# Patient Record
Sex: FEMALE | Race: WHITE | NOT HISPANIC OR LATINO | Employment: OTHER | URBAN - METROPOLITAN AREA
[De-identification: names, ages, dates, MRNs, and addresses within clinical notes are randomized per-mention and may not be internally consistent; named-entity substitution may affect disease eponyms.]

---

## 2023-09-29 PROBLEM — R41.3 MEMORY LOSS: Status: ACTIVE | Noted: 2023-09-29

## 2023-09-29 PROBLEM — R06.9 BREATHING PROBLEM: Status: ACTIVE | Noted: 2023-09-29

## 2023-09-29 PROBLEM — F02.80 MAJOR NEUROCOGNITIVE DISORDER DUE TO MULTIPLE ETIOLOGIES WITHOUT BEHAVIORAL DISTURBANCE (MULTI): Status: ACTIVE | Noted: 2023-09-29

## 2023-09-29 PROBLEM — M54.9 BACK ARTHRALGIA: Status: ACTIVE | Noted: 2023-09-29

## 2023-09-29 PROBLEM — I73.00 RAYNAUD'S PHENOMENON WITHOUT GANGRENE: Status: ACTIVE | Noted: 2023-09-29

## 2023-09-29 PROBLEM — G89.29 CHRONIC LOW BACK PAIN: Status: ACTIVE | Noted: 2023-09-29

## 2023-09-29 PROBLEM — F32.A ANXIETY AND DEPRESSION: Status: ACTIVE | Noted: 2023-09-29

## 2023-09-29 PROBLEM — I35.1 MILD AORTIC REGURGITATION: Status: ACTIVE | Noted: 2023-09-29

## 2023-09-29 PROBLEM — R00.1 SINUS BRADYCARDIA: Status: ACTIVE | Noted: 2023-09-29

## 2023-09-29 PROBLEM — R41.844 EXECUTIVE FUNCTION DEFICIT: Status: ACTIVE | Noted: 2023-09-29

## 2023-09-29 PROBLEM — I25.10 CORONARY ARTERY DISEASE: Status: ACTIVE | Noted: 2023-09-29

## 2023-09-29 PROBLEM — M47.816 LUMBAR SPONDYLOSIS: Status: ACTIVE | Noted: 2023-09-29

## 2023-09-29 PROBLEM — Z95.0 PACEMAKER: Status: ACTIVE | Noted: 2023-09-29

## 2023-09-29 PROBLEM — R76.8 ANA POSITIVE: Status: ACTIVE | Noted: 2023-09-29

## 2023-09-29 PROBLEM — M81.0 OSTEOPOROSIS: Status: ACTIVE | Noted: 2023-09-29

## 2023-09-29 PROBLEM — E78.2 MIXED HYPERLIPIDEMIA: Status: ACTIVE | Noted: 2023-09-29

## 2023-09-29 PROBLEM — M54.50 CHRONIC LOW BACK PAIN: Status: ACTIVE | Noted: 2023-09-29

## 2023-09-29 PROBLEM — H61.20 IMPACTED CERUMEN: Status: ACTIVE | Noted: 2023-09-29

## 2023-09-29 PROBLEM — R94.31 ABNORMAL EKG: Status: ACTIVE | Noted: 2023-09-29

## 2023-09-29 PROBLEM — F41.9 ANXIETY AND DEPRESSION: Status: ACTIVE | Noted: 2023-09-29

## 2023-09-29 RX ORDER — VIT C/E/ZN/COPPR/LUTEIN/ZEAXAN 250MG-90MG
CAPSULE ORAL
COMMUNITY

## 2023-09-29 RX ORDER — BROMFENAC SODIUM 0.7 MG/ML
1 SOLUTION/ DROPS OPHTHALMIC
COMMUNITY
Start: 2023-10-02 | End: 2023-11-01

## 2023-09-29 RX ORDER — LOTEPREDNOL ETABONATE 3.8 MG/G
1 GEL OPHTHALMIC 3 TIMES DAILY
COMMUNITY
Start: 2023-10-02 | End: 2023-11-01

## 2023-09-29 RX ORDER — CHOLECALCIFEROL (VITAMIN D3) 25 MCG
1 TABLET ORAL DAILY
COMMUNITY
Start: 2020-06-04

## 2023-09-29 RX ORDER — CIPROFLOXACIN HYDROCHLORIDE 3 MG/ML
SOLUTION/ DROPS OPHTHALMIC
COMMUNITY

## 2023-09-29 RX ORDER — DONEPEZIL HYDROCHLORIDE 5 MG/1
5 TABLET, FILM COATED ORAL NIGHTLY
COMMUNITY
End: 2023-12-26 | Stop reason: WASHOUT

## 2023-09-29 RX ORDER — DENOSUMAB 60 MG/ML
INJECTION SUBCUTANEOUS
COMMUNITY

## 2023-09-29 RX ORDER — ROSUVASTATIN CALCIUM 40 MG/1
40 TABLET, COATED ORAL
COMMUNITY
Start: 2023-05-08 | End: 2024-06-10

## 2023-09-29 RX ORDER — ALBUTEROL SULFATE 0.83 MG/ML
3 SOLUTION RESPIRATORY (INHALATION) EVERY 6 HOURS PRN
COMMUNITY

## 2023-09-29 RX ORDER — LANOLIN ALCOHOL/MO/W.PET/CERES
1 CREAM (GRAM) TOPICAL DAILY
COMMUNITY

## 2023-09-29 RX ORDER — BESIFLOXACIN 6 MG/ML
1 SUSPENSION OPHTHALMIC 3 TIMES DAILY
COMMUNITY
Start: 2023-10-02 | End: 2023-10-09

## 2023-09-29 RX ORDER — TIMOLOL MALEATE 5 MG/ML
1 SOLUTION/ DROPS OPHTHALMIC 2 TIMES DAILY
COMMUNITY

## 2023-09-29 RX ORDER — VIT C/E/CUPERIC/ZINC/LUTEIN 226-90-0.8
1 CAPSULE ORAL DAILY
COMMUNITY

## 2023-09-29 RX ORDER — DONEPEZIL HYDROCHLORIDE 10 MG/1
1 TABLET, FILM COATED ORAL NIGHTLY
COMMUNITY
Start: 2016-12-05 | End: 2023-12-26 | Stop reason: SDUPTHER

## 2023-09-29 RX ORDER — CALCIUM CARBONATE 600 MG
1 TABLET ORAL DAILY
COMMUNITY

## 2023-09-29 RX ORDER — HYDROXYZINE HYDROCHLORIDE 10 MG/1
10 TABLET, FILM COATED ORAL 2 TIMES DAILY
COMMUNITY
Start: 2023-04-18

## 2023-09-29 RX ORDER — OMEPRAZOLE 40 MG/1
40 CAPSULE, DELAYED RELEASE ORAL DAILY
COMMUNITY

## 2023-09-29 RX ORDER — ALENDRONATE SODIUM 70 MG/1
70 TABLET ORAL
COMMUNITY
End: 2024-06-03 | Stop reason: WASHOUT

## 2023-09-29 RX ORDER — SIMVASTATIN 40 MG/1
0.5 TABLET, FILM COATED ORAL NIGHTLY
COMMUNITY
End: 2024-06-03 | Stop reason: WASHOUT

## 2023-09-29 RX ORDER — ESCITALOPRAM OXALATE 20 MG/1
0.5 TABLET ORAL DAILY
COMMUNITY
Start: 2018-01-05

## 2023-10-05 ENCOUNTER — TRANSCRIBE ORDERS (OUTPATIENT)
Dept: PAIN MEDICINE | Facility: CLINIC | Age: 83
End: 2023-10-05
Payer: MEDICARE

## 2023-10-05 DIAGNOSIS — M47.817 LUMBOSACRAL SPONDYLOSIS WITHOUT MYELOPATHY: ICD-10-CM

## 2023-10-06 ENCOUNTER — ANCILLARY PROCEDURE (OUTPATIENT)
Dept: RADIOLOGY | Facility: CLINIC | Age: 83
End: 2023-10-06
Payer: MEDICARE

## 2023-10-06 ENCOUNTER — HOSPITAL ENCOUNTER (OUTPATIENT)
Dept: PAIN MEDICINE | Facility: CLINIC | Age: 83
Discharge: HOME | End: 2023-10-06
Payer: MEDICARE

## 2023-10-06 ENCOUNTER — PREP FOR PROCEDURE (OUTPATIENT)
Dept: PAIN MEDICINE | Facility: CLINIC | Age: 83
End: 2023-10-06

## 2023-10-06 ENCOUNTER — APPOINTMENT (OUTPATIENT)
Dept: PAIN MEDICINE | Facility: CLINIC | Age: 83
End: 2023-10-06
Payer: MEDICARE

## 2023-10-06 VITALS
TEMPERATURE: 95.5 F | SYSTOLIC BLOOD PRESSURE: 99 MMHG | HEART RATE: 60 BPM | OXYGEN SATURATION: 100 % | DIASTOLIC BLOOD PRESSURE: 72 MMHG | RESPIRATION RATE: 16 BRPM

## 2023-10-06 DIAGNOSIS — M47.816 LUMBAR SPONDYLOSIS: Primary | ICD-10-CM

## 2023-10-06 DIAGNOSIS — M47.817 LUMBOSACRAL SPONDYLOSIS WITHOUT MYELOPATHY: ICD-10-CM

## 2023-10-06 PROCEDURE — 64493 INJ PARAVERT F JNT L/S 1 LEV: CPT | Performed by: ANESTHESIOLOGY

## 2023-10-06 PROCEDURE — 2500000005 HC RX 250 GENERAL PHARMACY W/O HCPCS

## 2023-10-06 PROCEDURE — 64494 INJ PARAVERT F JNT L/S 2 LEV: CPT | Performed by: ANESTHESIOLOGY

## 2023-10-06 PROCEDURE — 77003 FLUOROGUIDE FOR SPINE INJECT: CPT

## 2023-10-06 PROCEDURE — 2500000004 HC RX 250 GENERAL PHARMACY W/ HCPCS (ALT 636 FOR OP/ED)

## 2023-10-06 PROCEDURE — A4216 STERILE WATER/SALINE, 10 ML: HCPCS

## 2023-10-06 RX ORDER — SODIUM CHLORIDE 9 MG/ML
INJECTION, SOLUTION INTRAMUSCULAR; INTRAVENOUS; SUBCUTANEOUS
Status: COMPLETED
Start: 2023-10-06 | End: 2023-10-06

## 2023-10-06 RX ORDER — TRIAMCINOLONE ACETONIDE 40 MG/ML
INJECTION, SUSPENSION INTRA-ARTICULAR; INTRAMUSCULAR
Status: COMPLETED
Start: 2023-10-06 | End: 2023-10-06

## 2023-10-06 RX ORDER — LIDOCAINE HYDROCHLORIDE 5 MG/ML
INJECTION, SOLUTION INFILTRATION; INTRAVENOUS
Status: COMPLETED
Start: 2023-10-06 | End: 2023-10-06

## 2023-10-06 RX ORDER — ROPIVACAINE HYDROCHLORIDE 5 MG/ML
INJECTION, SOLUTION EPIDURAL; INFILTRATION; PERINEURAL
Status: COMPLETED
Start: 2023-10-06 | End: 2023-10-06

## 2023-10-06 RX ADMIN — ROPIVACAINE HYDROCHLORIDE 100 MG: 5 INJECTION, SOLUTION EPIDURAL; INFILTRATION; PERINEURAL at 14:38

## 2023-10-06 RX ADMIN — TRIAMCINOLONE ACETONIDE 40 MG: 40 INJECTION, SUSPENSION INTRA-ARTICULAR; INTRAMUSCULAR at 14:40

## 2023-10-06 RX ADMIN — SODIUM CHLORIDE 10 ML: 9 INJECTION, SOLUTION INTRAMUSCULAR; INTRAVENOUS; SUBCUTANEOUS at 14:39

## 2023-10-06 RX ADMIN — LIDOCAINE HYDROCHLORIDE 250 MG: 5 INJECTION, SOLUTION INFILTRATION at 14:38

## 2023-10-06 ASSESSMENT — ENCOUNTER SYMPTOMS
CONSTIPATION: 0
DIARRHEA: 0
WEAKNESS: 0
CHILLS: 0
SHORTNESS OF BREATH: 0
DIZZINESS: 0
DIAPHORESIS: 0
SPEECH DIFFICULTY: 0
EYE DISCHARGE: 0
COUGH: 0
NECK STIFFNESS: 1
NECK PAIN: 1
NUMBNESS: 1
WHEEZING: 0
SEIZURES: 0
DIFFICULTY URINATING: 0
BACK PAIN: 1
NAUSEA: 0
VOMITING: 0
ARTHRALGIAS: 1
CHEST TIGHTNESS: 0
BLOOD IN STOOL: 0
FEVER: 0

## 2023-10-06 ASSESSMENT — PAIN - FUNCTIONAL ASSESSMENT: PAIN_FUNCTIONAL_ASSESSMENT: 0-10

## 2023-10-06 ASSESSMENT — PAIN SCALES - GENERAL: PAINLEVEL_OUTOF10: 5 - MODERATE PAIN

## 2023-10-06 ASSESSMENT — COLUMBIA-SUICIDE SEVERITY RATING SCALE - C-SSRS
2. HAVE YOU ACTUALLY HAD ANY THOUGHTS OF KILLING YOURSELF?: NO
1. IN THE PAST MONTH, HAVE YOU WISHED YOU WERE DEAD OR WISHED YOU COULD GO TO SLEEP AND NOT WAKE UP?: NO
6. HAVE YOU EVER DONE ANYTHING, STARTED TO DO ANYTHING, OR PREPARED TO DO ANYTHING TO END YOUR LIFE?: NO

## 2023-10-06 NOTE — H&P
History Of Present Illness  Marysol Hill is a 83 y.o. female presenting with chronic low back pain.      Past Medical History  Past Medical History:   Diagnosis Date    Personal history of other diseases of the circulatory system     History of abnormal electrocardiography    Personal history of other diseases of the circulatory system 08/31/2019    History of sinus bradycardia    Personal history of other endocrine, nutritional and metabolic disease     History of hypercholesterolemia       Surgical History  Past Surgical History:   Procedure Laterality Date    APPENDECTOMY  01/13/2016    Appendectomy    HYSTERECTOMY  01/13/2016    Hysterectomy    OTHER SURGICAL HISTORY  01/13/2016    Parathyroid Resection    OTHER SURGICAL HISTORY  10/28/2020    Pacemaker insertion        Social History  She reports that she has quit smoking. Her smoking use included cigarettes. She has never used smokeless tobacco. She reports current alcohol use. She reports that she does not use drugs.    Family History  Family History   Problem Relation Name Age of Onset    Alzheimer's disease Mother      Dementia Mother      Other (CVA) Father          Allergies  Mushroom    Review of Systems   Constitutional:  Negative for chills, diaphoresis and fever.   HENT:  Negative for ear discharge and tinnitus.    Eyes:  Negative for discharge.   Respiratory:  Negative for cough, chest tightness, shortness of breath and wheezing.    Cardiovascular:  Negative for chest pain.   Gastrointestinal:  Negative for blood in stool, constipation, diarrhea, nausea and vomiting.   Endocrine: Negative for polyuria.   Genitourinary:  Negative for difficulty urinating.   Musculoskeletal:  Positive for arthralgias, back pain, gait problem, neck pain and neck stiffness.   Skin:  Negative for rash.   Neurological:  Positive for numbness. Negative for dizziness, seizures, speech difficulty and weakness.        Physical Exam  Constitutional:       Appearance: Normal  appearance.   HENT:      Head: Normocephalic.      Nose: Nose normal.      Mouth/Throat:      Mouth: Mucous membranes are moist.      Pharynx: Oropharynx is clear.   Eyes:      Extraocular Movements: Extraocular movements intact.      Conjunctiva/sclera: Conjunctivae normal.      Pupils: Pupils are equal, round, and reactive to light.   Cardiovascular:      Rate and Rhythm: Normal rate.   Pulmonary:      Effort: Pulmonary effort is normal. No respiratory distress.      Breath sounds: No wheezing.   Chest:      Chest wall: No tenderness.   Abdominal:      Palpations: Abdomen is soft.   Musculoskeletal:      Cervical back: No rigidity.   Skin:     General: Skin is warm and dry.      Findings: No rash.   Neurological:      Mental Status: She is alert and oriented to person, place, and time.      Sensory: Sensory deficit present.      Motor: Weakness present.      Gait: Gait abnormal.   Psychiatric:         Mood and Affect: Mood normal.         Behavior: Behavior normal.          Last Recorded Vitals  There were no vitals taken for this visit.    Assessment/Plan     Lumbar spondylosis     Bilateral medial branch nerve blocks at L4/5 and L5/S1          Mike Matthews MD

## 2023-10-06 NOTE — PROGRESS NOTES
Dx: Lumbar spondylosis.     PROCEDURE:   Bilateral L4/5 and L5/S1 medial branch nerve blocsk   2.   Fluoroscopic guidance     The patient was identified in the preop hold after risks benefits and alternatives to the procedure were discussed. Informed consent was obtained. The patient was then taken to the OR and placed in the prone position on the operating table with standard ASA monitors applied. The skin of their lumbar spine was prepped and draped in the usual sterile fashion using Chloraprep. Fluoroscopic guidance was used to dipak the skin for initial needle placement. Next the skin and the subcutaneous tissues was anesthetized with 6ml of 0.5% Lidocaine.  Then FOUR 20 g 3 1/2 inch VENOM radiofrequency (RF) needles were advanced under fluoroscopic guidance towards the mid points of the articular process of BILATERAL  L4/5 AND L5/S1.     Then 5ml of 0.5% Ropivacaine with 40mg of KENALOG was injected in equally divided doses at all sites. The needles were removed, hemostasis obtained and needle entry sites covered with a sterile dressing.  The patient tolerated the procedure well without any problems and vital signs remained stable throughout. The patient was then taken to the Recovery Area and discharged home in good condition.    PLAN: The pt will follow up in the office in one to two months' time to report their results with the procedure.

## 2023-10-10 ENCOUNTER — HOSPITAL ENCOUNTER (OUTPATIENT)
Dept: CARDIOLOGY | Facility: CLINIC | Age: 83
Discharge: HOME | End: 2023-10-10
Payer: MEDICARE

## 2023-10-10 ENCOUNTER — APPOINTMENT (OUTPATIENT)
Dept: PHYSICAL THERAPY | Facility: CLINIC | Age: 83
End: 2023-10-10
Payer: MEDICARE

## 2023-10-10 DIAGNOSIS — I49.5 SSS (SICK SINUS SYNDROME) (MULTI): ICD-10-CM

## 2023-10-10 DIAGNOSIS — Z95.0 CARDIAC PACEMAKER IN SITU: ICD-10-CM

## 2023-10-10 DIAGNOSIS — R00.1 SINUS BRADYCARDIA: ICD-10-CM

## 2023-10-10 PROCEDURE — 93294 REM INTERROG EVL PM/LDLS PM: CPT | Performed by: INTERNAL MEDICINE

## 2023-10-10 PROCEDURE — 93296 REM INTERROG EVL PM/IDS: CPT

## 2023-10-17 ENCOUNTER — APPOINTMENT (OUTPATIENT)
Dept: PHYSICAL THERAPY | Facility: CLINIC | Age: 83
End: 2023-10-17
Payer: MEDICARE

## 2023-10-24 ENCOUNTER — HOSPITAL ENCOUNTER (OUTPATIENT)
Dept: CARDIOLOGY | Facility: CLINIC | Age: 83
Discharge: HOME | End: 2023-10-24
Payer: MEDICARE

## 2023-10-24 ENCOUNTER — APPOINTMENT (OUTPATIENT)
Dept: PHYSICAL THERAPY | Facility: CLINIC | Age: 83
End: 2023-10-24
Payer: MEDICARE

## 2023-10-24 DIAGNOSIS — I49.5 SICK SINUS SYNDROME (MULTI): ICD-10-CM

## 2023-10-25 DIAGNOSIS — I49.5 SICK SINUS SYNDROME (MULTI): ICD-10-CM

## 2023-11-06 ENCOUNTER — OFFICE VISIT (OUTPATIENT)
Dept: PAIN MEDICINE | Facility: CLINIC | Age: 83
End: 2023-11-06
Payer: MEDICARE

## 2023-11-06 VITALS
HEART RATE: 60 BPM | RESPIRATION RATE: 15 BRPM | DIASTOLIC BLOOD PRESSURE: 60 MMHG | SYSTOLIC BLOOD PRESSURE: 128 MMHG | TEMPERATURE: 96.3 F | WEIGHT: 118 LBS | HEIGHT: 64 IN | BODY MASS INDEX: 20.14 KG/M2

## 2023-11-06 DIAGNOSIS — M47.817 FACET ARTHROPATHY, LUMBOSACRAL: ICD-10-CM

## 2023-11-06 DIAGNOSIS — M54.50 CHRONIC LOW BACK PAIN, UNSPECIFIED BACK PAIN LATERALITY, UNSPECIFIED WHETHER SCIATICA PRESENT: ICD-10-CM

## 2023-11-06 DIAGNOSIS — M47.816 LUMBAR SPONDYLOSIS: Primary | ICD-10-CM

## 2023-11-06 DIAGNOSIS — G89.29 CHRONIC LOW BACK PAIN, UNSPECIFIED BACK PAIN LATERALITY, UNSPECIFIED WHETHER SCIATICA PRESENT: ICD-10-CM

## 2023-11-06 DIAGNOSIS — M54.9 BACK ARTHRALGIA: ICD-10-CM

## 2023-11-06 PROCEDURE — 99214 OFFICE O/P EST MOD 30 MIN: CPT | Performed by: ANESTHESIOLOGY

## 2023-11-06 SDOH — ECONOMIC STABILITY: FOOD INSECURITY: WITHIN THE PAST 12 MONTHS, YOU WORRIED THAT YOUR FOOD WOULD RUN OUT BEFORE YOU GOT MONEY TO BUY MORE.: NEVER TRUE

## 2023-11-06 SDOH — ECONOMIC STABILITY: FOOD INSECURITY: WITHIN THE PAST 12 MONTHS, THE FOOD YOU BOUGHT JUST DIDN'T LAST AND YOU DIDN'T HAVE MONEY TO GET MORE.: NEVER TRUE

## 2023-11-06 ASSESSMENT — LIFESTYLE VARIABLES
HOW OFTEN DO YOU HAVE A DRINK CONTAINING ALCOHOL: MONTHLY OR LESS
AUDIT-C TOTAL SCORE: 2
HOW MANY STANDARD DRINKS CONTAINING ALCOHOL DO YOU HAVE ON A TYPICAL DAY: 1 OR 2
SKIP TO QUESTIONS 9-10: 0
HOW OFTEN DO YOU HAVE SIX OR MORE DRINKS ON ONE OCCASION: LESS THAN MONTHLY

## 2023-11-06 ASSESSMENT — PAIN SCALES - GENERAL: PAINLEVEL: 5

## 2023-11-06 ASSESSMENT — PATIENT HEALTH QUESTIONNAIRE - PHQ9
1. LITTLE INTEREST OR PLEASURE IN DOING THINGS: NOT AT ALL
2. FEELING DOWN, DEPRESSED OR HOPELESS: NOT AT ALL
SUM OF ALL RESPONSES TO PHQ9 QUESTIONS 1 AND 2: 0

## 2023-11-06 NOTE — PROGRESS NOTES
History Of Present Illness  Marysol Hill is a 83 y.o. female presenting with   Chief Complaint   Patient presents with    Back Pain     Patient returns for  chronic low back pain across her back worse on the right side. The pain is constant, worse with getting out of chair and better with sitting. The pain is a constant aching like sensation. Denies LE paresthesias, weakness, saddle anesthesia, bowel or bladder incontinence. To manage this pain the patient has attempted IBUPROFEN AND PT WITH NO RELIEF. The patients chronic GERD, Asthma are stable    PSHx  -Appendectomy  -Parathyroid surgery  -Hysterectomy    SocHx  -Quit tob 60 years ago  -Occ EtOH  -Worked for chief of surgery at [employer]    PAIN SCORE: 7 / 10    Neuro: Dr. Medrano  Surgeon: Dr. Fairchild          Past Medical History  She has a past medical history of Personal history of other diseases of the circulatory system, Personal history of other diseases of the circulatory system (08/31/2019), and Personal history of other endocrine, nutritional and metabolic disease.    Surgical History  She has a past surgical history that includes Appendectomy (01/13/2016); Hysterectomy (01/13/2016); Other surgical history (01/13/2016); and Other surgical history (10/28/2020).     Social History  She reports that she has quit smoking. Her smoking use included cigarettes. She has never used smokeless tobacco. She reports current alcohol use. She reports that she does not use drugs.    Family History  Family History   Problem Relation Name Age of Onset    Alzheimer's disease Mother      Dementia Mother      Other (CVA) Father          Allergies  Mushroom    Review of Systems    All other systems reviewed and negative for any deficits. Pertinent positives and negatives were considered in the medical decision making process.        Physical Exam  /60   Pulse 60   Temp 35.7 °C (96.3 °F)   Resp 15   Wt 53.5 kg (118 lb)     General: Pt appears stated  age    Eyes: Conjunctiva non-icteric and lids without obvious rash or drooping. Pupils are symmetric    ENT: External ears are without deformity or rash. Hearing is grossly intact    Neck: No JVD noted, tracheal position midline.     Respiratory: No gasping or shortness of breath noted, no use of accessory muscles noted    Cardiovascular: Extremities show no edema or varicosities    Skin: No rashes or open lesions/ulcers identified on skin.     Musculoskeletal: Gait is grossly normal    Digits/nails show no clubbing or cyanosis    Exam of muscles/joints/bones shows no gross atrophy and no abnormal/involuntary movements in the head/neckNo asymmetry or masses noted in the head/neck    Stability: no subluxation noted on movement of bilateral upper extremities or head/neck    Strength: 4/5 in B/L lower extremities     Range of Motion: dec spinal flexion and extension due to pain.     Sensation: In tact     Cranial nerves 2-12 are grossly intact    Psychiatric: Pt is alert and oriented to time, place and person.         Assessment/Plan   1. Lumbar spondylosis  FL pain management TC      2. Back arthralgia        3. Chronic low back pain, unspecified back pain laterality, unspecified whether sciatica present        4. Facet arthropathy, lumbosacral   Medial Nerve Branch Block          Lumbar spondylosis (721.3) (M47.816)   · Chronic low back pain (724.2,338.29) (M54.50,G89.29)    Provider Impressions    1. I have provided the patient with a list of physical therapy exercises to learn and perform to strengthen core, maintain stabilization, and reduce pain. We reviewed the exercises in detail and I encouraged them to perform them on a regular basis.    - The pt is scheduled to start water therapy.     2. I would recommend the pt start on Gabapentin to help with nerve related pain. We discussed the risks, benefits, and side effects to this medication including the mechanism of action and the pt understands and agrees.    3.  I extensively reviewed the patients MRI findings (8-3-2023 [facility]) in detail, including review of the actual images and provided a detailed explanation of the findings using a spine model.     There is lumbar spondylosis and disc degeneration at multiple levels.     4. The patient is a candidate for an LUMBAR MEDIAL BRANCH L4/5 and L5/S1 bilaterally under fluoroscopic guidance to treat back pain. I spent time with the patient discussing all of the risks, benefits, and alternatives to this measure. Including but not limited to spinal infection, epidural hematoma/abscess, paralysis, nerve injury, steroid effects, and spinal headache. The patient understands and agrees to proceed.    Her last injection at this level was on 10-6-2023 and she reported 80% relief of her pain for 1 months time. She was able to sit and stand with much less pain.     Will plan a repeat diagnostic block in November 2023. In anticipation for an RFA if she obtains good relief of her pain.     I spent time with the patient reviewing their imaging and discussing the risks benefits and alternatives to the above plan. A total of 30 minutes was spent reviewing the data and greater than 50% of that time was with the patient during the face to face encounter discussing treatment options both surgical, non-surgical, and minimally invasive techniques.              Mike Matthews MD

## 2023-11-08 ENCOUNTER — HOSPITAL ENCOUNTER (OUTPATIENT)
Dept: RADIOLOGY | Facility: HOSPITAL | Age: 83
Discharge: HOME | End: 2023-11-08
Payer: MEDICARE

## 2023-11-08 ENCOUNTER — HOSPITAL ENCOUNTER (OUTPATIENT)
Dept: CARDIOLOGY | Facility: HOSPITAL | Age: 83
Discharge: HOME | End: 2023-11-08
Payer: MEDICARE

## 2023-11-08 VITALS — DIASTOLIC BLOOD PRESSURE: 83 MMHG | SYSTOLIC BLOOD PRESSURE: 125 MMHG | HEART RATE: 70 BPM | OXYGEN SATURATION: 96 %

## 2023-11-08 DIAGNOSIS — Z95.0 PACEMAKER: ICD-10-CM

## 2023-11-08 DIAGNOSIS — I49.5 SICK SINUS SYNDROME (MULTI): ICD-10-CM

## 2023-11-08 DIAGNOSIS — R74.8 ABNORMAL LEVELS OF OTHER SERUM ENZYMES: ICD-10-CM

## 2023-11-08 DIAGNOSIS — K83.8 OTHER SPECIFIED DISEASES OF BILIARY TRACT: ICD-10-CM

## 2023-11-08 PROCEDURE — 93286 PERI-PX EVAL PM/LDLS PM IP: CPT | Performed by: INTERNAL MEDICINE

## 2023-11-08 PROCEDURE — 74181 MRI ABDOMEN W/O CONTRAST: CPT

## 2023-11-08 PROCEDURE — 93279 PRGRMG DEV EVAL PM/LDLS PM: CPT

## 2023-11-08 PROCEDURE — 74183 MRI ABD W/O CNTR FLWD CNTR: CPT | Performed by: RADIOLOGY

## 2023-11-08 NOTE — NURSING NOTE
Patient arrives to MRI to have scan of her abdomen/MRCP. Arrival of device clinic nurse to interrogate her pacemaker into MRI safe mode. DOO@ 70/min. BL  1425 Return of device clinic nurse to reset pot. To her previous setting. BL

## 2023-11-27 ENCOUNTER — DOCUMENTATION (OUTPATIENT)
Dept: PHYSICAL THERAPY | Facility: CLINIC | Age: 83
End: 2023-11-27
Payer: MEDICARE

## 2023-11-27 NOTE — PROGRESS NOTES
Physical Therapy    Discharge Summary    Name: Marysol Hill  MRN: [id number]  : 1940  Date: 23    Discharge Summary: PT    Discharge Information: Date of discharge 23    Therapy Summary: Discharge patient from physical therapy from 23.  The pt attended the evaluation and did not schedule follow up visits.     Discharge Status: Discharged.     Rehab Discharge Reason: Failed to schedule and/or keep follow-up appointment(s)

## 2023-11-28 ENCOUNTER — APPOINTMENT (OUTPATIENT)
Dept: PAIN MEDICINE | Facility: CLINIC | Age: 83
End: 2023-11-28
Payer: MEDICARE

## 2023-11-28 ENCOUNTER — APPOINTMENT (OUTPATIENT)
Dept: RADIOLOGY | Facility: CLINIC | Age: 83
End: 2023-11-28
Payer: MEDICARE

## 2023-12-01 ENCOUNTER — LAB (OUTPATIENT)
Dept: LAB | Facility: LAB | Age: 83
End: 2023-12-01
Payer: MEDICARE

## 2023-12-01 DIAGNOSIS — E78.5 HYPERLIPIDEMIA, UNSPECIFIED: ICD-10-CM

## 2023-12-01 DIAGNOSIS — D47.2 MONOCLONAL GAMMOPATHY: Primary | ICD-10-CM

## 2023-12-01 LAB
ALBUMIN SERPL BCP-MCNC: 3.8 G/DL (ref 3.4–5)
ALP SERPL-CCNC: 32 U/L (ref 33–136)
ALT SERPL W P-5'-P-CCNC: 43 U/L (ref 7–45)
ANION GAP SERPL CALC-SCNC: 12 MMOL/L (ref 10–20)
AST SERPL W P-5'-P-CCNC: 39 U/L (ref 9–39)
BASOPHILS # BLD AUTO: 0.04 X10*3/UL (ref 0–0.1)
BASOPHILS # BLD AUTO: 0.04 X10*3/UL (ref 0–0.1)
BASOPHILS NFR BLD AUTO: 0.6 %
BASOPHILS NFR BLD AUTO: 0.6 %
BILIRUB SERPL-MCNC: 0.4 MG/DL (ref 0–1.2)
BUN SERPL-MCNC: 15 MG/DL (ref 6–23)
CALCIUM SERPL-MCNC: 9.1 MG/DL (ref 8.6–10.6)
CHLORIDE SERPL-SCNC: 105 MMOL/L (ref 98–107)
CHOLEST SERPL-MCNC: 134 MG/DL (ref 0–199)
CHOLESTEROL/HDL RATIO: 2.3
CO2 SERPL-SCNC: 31 MMOL/L (ref 21–32)
CREAT SERPL-MCNC: 0.74 MG/DL (ref 0.5–1.05)
EOSINOPHIL # BLD AUTO: 0.15 X10*3/UL (ref 0–0.4)
EOSINOPHIL # BLD AUTO: 0.15 X10*3/UL (ref 0–0.4)
EOSINOPHIL NFR BLD AUTO: 2.3 %
EOSINOPHIL NFR BLD AUTO: 2.3 %
ERYTHROCYTE [DISTWIDTH] IN BLOOD BY AUTOMATED COUNT: 13 % (ref 11.5–14.5)
GFR SERPL CREATININE-BSD FRML MDRD: 81 ML/MIN/1.73M*2
GLUCOSE SERPL-MCNC: 98 MG/DL (ref 74–99)
HCT VFR BLD AUTO: 42.8 % (ref 36–46)
HDLC SERPL-MCNC: 58.7 MG/DL
HGB BLD-MCNC: 13.5 G/DL (ref 12–16)
IMM GRANULOCYTES # BLD AUTO: 0.01 X10*3/UL (ref 0–0.5)
IMM GRANULOCYTES # BLD AUTO: 0.01 X10*3/UL (ref 0–0.5)
IMM GRANULOCYTES NFR BLD AUTO: 0.2 % (ref 0–0.9)
IMM GRANULOCYTES NFR BLD AUTO: 0.2 % (ref 0–0.9)
LDLC SERPL CALC-MCNC: 57 MG/DL
LYMPHOCYTES # BLD AUTO: 1.53 X10*3/UL (ref 0.8–3)
LYMPHOCYTES # BLD AUTO: 1.53 X10*3/UL (ref 0.8–3)
LYMPHOCYTES NFR BLD AUTO: 23.8 %
LYMPHOCYTES NFR BLD AUTO: 23.8 %
MCH RBC QN AUTO: 32.6 PG (ref 26–34)
MCHC RBC AUTO-ENTMCNC: 31.5 G/DL (ref 32–36)
MCV RBC AUTO: 103 FL (ref 80–100)
MONOCYTES # BLD AUTO: 0.54 X10*3/UL (ref 0.05–0.8)
MONOCYTES # BLD AUTO: 0.54 X10*3/UL (ref 0.05–0.8)
MONOCYTES NFR BLD AUTO: 8.4 %
MONOCYTES NFR BLD AUTO: 8.4 %
NEUTROPHILS # BLD AUTO: 4.16 X10*3/UL (ref 1.6–5.5)
NEUTROPHILS # BLD AUTO: 4.16 X10*3/UL (ref 1.6–5.5)
NEUTROPHILS NFR BLD AUTO: 64.7 %
NEUTROPHILS NFR BLD AUTO: 64.7 %
NON HDL CHOLESTEROL: 75 MG/DL (ref 0–149)
NRBC BLD-RTO: 0 /100 WBCS (ref 0–0)
PLATELET # BLD AUTO: 197 X10*3/UL (ref 150–450)
POTASSIUM SERPL-SCNC: 4.5 MMOL/L (ref 3.5–5.3)
PROT SERPL-MCNC: 5.8 G/DL (ref 6.4–8.2)
RBC # BLD AUTO: 4.14 X10*6/UL (ref 4–5.2)
SODIUM SERPL-SCNC: 143 MMOL/L (ref 136–145)
TRIGL SERPL-MCNC: 92 MG/DL (ref 0–149)
TSH SERPL-ACNC: 2.39 MIU/L (ref 0.44–3.98)
VLDL: 18 MG/DL (ref 0–40)
WBC # BLD AUTO: 6.4 X10*3/UL (ref 4.4–11.3)

## 2023-12-01 PROCEDURE — 80053 COMPREHEN METABOLIC PANEL: CPT

## 2023-12-01 PROCEDURE — 80061 LIPID PANEL: CPT

## 2023-12-01 PROCEDURE — 84443 ASSAY THYROID STIM HORMONE: CPT

## 2023-12-01 PROCEDURE — 85025 COMPLETE CBC W/AUTO DIFF WBC: CPT

## 2023-12-01 PROCEDURE — 36415 COLL VENOUS BLD VENIPUNCTURE: CPT

## 2023-12-12 ENCOUNTER — HOSPITAL ENCOUNTER (OUTPATIENT)
Dept: PAIN MEDICINE | Facility: CLINIC | Age: 83
Discharge: HOME | End: 2023-12-12
Payer: MEDICARE

## 2023-12-12 ENCOUNTER — ANCILLARY PROCEDURE (OUTPATIENT)
Dept: RADIOLOGY | Facility: CLINIC | Age: 83
End: 2023-12-12
Payer: MEDICARE

## 2023-12-12 VITALS
SYSTOLIC BLOOD PRESSURE: 123 MMHG | OXYGEN SATURATION: 99 % | RESPIRATION RATE: 14 BRPM | HEART RATE: 65 BPM | DIASTOLIC BLOOD PRESSURE: 59 MMHG | TEMPERATURE: 97.5 F

## 2023-12-12 DIAGNOSIS — M47.816 LUMBAR SPONDYLOSIS: Primary | ICD-10-CM

## 2023-12-12 DIAGNOSIS — M47.816 LUMBAR SPONDYLOSIS: ICD-10-CM

## 2023-12-12 DIAGNOSIS — M54.50 CHRONIC LOW BACK PAIN, UNSPECIFIED BACK PAIN LATERALITY, UNSPECIFIED WHETHER SCIATICA PRESENT: ICD-10-CM

## 2023-12-12 DIAGNOSIS — G89.29 CHRONIC LOW BACK PAIN, UNSPECIFIED BACK PAIN LATERALITY, UNSPECIFIED WHETHER SCIATICA PRESENT: ICD-10-CM

## 2023-12-12 PROCEDURE — 2500000004 HC RX 250 GENERAL PHARMACY W/ HCPCS (ALT 636 FOR OP/ED)

## 2023-12-12 PROCEDURE — 64493 INJ PARAVERT F JNT L/S 1 LEV: CPT | Mod: 50 | Performed by: ANESTHESIOLOGY

## 2023-12-12 PROCEDURE — 2500000005 HC RX 250 GENERAL PHARMACY W/O HCPCS

## 2023-12-12 PROCEDURE — 77003 FLUOROGUIDE FOR SPINE INJECT: CPT

## 2023-12-12 PROCEDURE — 7100000010 HC PHASE TWO TIME - EACH INCREMENTAL 1 MINUTE

## 2023-12-12 PROCEDURE — 64494 INJ PARAVERT F JNT L/S 2 LEV: CPT

## 2023-12-12 PROCEDURE — 7100000009 HC PHASE TWO TIME - INITIAL BASE CHARGE

## 2023-12-12 RX ORDER — TRIAMCINOLONE ACETONIDE 40 MG/ML
INJECTION, SUSPENSION INTRA-ARTICULAR; INTRAMUSCULAR
Status: COMPLETED
Start: 2023-12-12 | End: 2023-12-12

## 2023-12-12 RX ORDER — ROPIVACAINE HYDROCHLORIDE 5 MG/ML
INJECTION, SOLUTION EPIDURAL; INFILTRATION; PERINEURAL
Status: COMPLETED
Start: 2023-12-12 | End: 2023-12-12

## 2023-12-12 RX ORDER — LIDOCAINE HYDROCHLORIDE 5 MG/ML
INJECTION, SOLUTION INFILTRATION; INTRAVENOUS
Status: COMPLETED
Start: 2023-12-12 | End: 2023-12-12

## 2023-12-12 RX ADMIN — ROPIVACAINE HYDROCHLORIDE 150 MG: 5 INJECTION, SOLUTION EPIDURAL; INFILTRATION; PERINEURAL at 12:06

## 2023-12-12 RX ADMIN — TRIAMCINOLONE ACETONIDE 40 MG: 40 INJECTION, SUSPENSION INTRA-ARTICULAR; INTRAMUSCULAR at 12:06

## 2023-12-12 RX ADMIN — LIDOCAINE HYDROCHLORIDE 250 MG: 5 INJECTION, SOLUTION INFILTRATION at 12:06

## 2023-12-12 ASSESSMENT — ENCOUNTER SYMPTOMS
NUMBNESS: 1
ARTHRALGIAS: 1
WEAKNESS: 0
NECK STIFFNESS: 1
SHORTNESS OF BREATH: 0
NECK PAIN: 1
DIZZINESS: 0
VOMITING: 0
FEVER: 0
BACK PAIN: 1
NAUSEA: 0
CHEST TIGHTNESS: 0
DIAPHORESIS: 0
WHEEZING: 0
CONSTIPATION: 0
DIARRHEA: 0
DIFFICULTY URINATING: 0
SEIZURES: 0
BLOOD IN STOOL: 0
EYE DISCHARGE: 0
CHILLS: 0
SPEECH DIFFICULTY: 0
COUGH: 0

## 2023-12-12 ASSESSMENT — PAIN SCALES - GENERAL
PAINLEVEL_OUTOF10: 7
PAINLEVEL_OUTOF10: 5 - MODERATE PAIN

## 2023-12-12 ASSESSMENT — PAIN - FUNCTIONAL ASSESSMENT: PAIN_FUNCTIONAL_ASSESSMENT: 0-10

## 2023-12-12 ASSESSMENT — COLUMBIA-SUICIDE SEVERITY RATING SCALE - C-SSRS
2. HAVE YOU ACTUALLY HAD ANY THOUGHTS OF KILLING YOURSELF?: NO
6. HAVE YOU EVER DONE ANYTHING, STARTED TO DO ANYTHING, OR PREPARED TO DO ANYTHING TO END YOUR LIFE?: NO
1. IN THE PAST MONTH, HAVE YOU WISHED YOU WERE DEAD OR WISHED YOU COULD GO TO SLEEP AND NOT WAKE UP?: NO

## 2023-12-12 NOTE — OP NOTE
12/12/2023    Preop Diagnosis: Lumbar spondylosis  Postop Diagnosis: Lumbar spondylosis    Procedure:   1. Bilateral Lumbar medial branch blocks at L4/5 and L5/S1  2. Fluoroscopic guidance    Complications: None    EBL: None       PROCEDURE: The patient was identified in the preop hold after risks benefits and alternatives to the procedure were discussed, informed consent was obtained. The patient was then taken to the OR and placed in the prone position on the operating table with standard ASA monitors applied. The skin of their lumbar spine was prepped and draped in the usual sterile fashion using Chloraprep. Fluoroscopic guidance was used to dipak the skin for initial needle placement. Next the skin and the subcutaneous tissues was anesthetized with 4ml of 0.5% Lidocaine.  Then FOUR 20 g 3 1/2 inch SPINAL needles were advanced under fluoroscopic guidance towards the mid points of the articular process of L 4/5 and L5/S1 vertebra BILATERALLY. Once needle position was confirmed a total of 4ml of 0.5% Ropivacaine and 40mg of Kenalog was injected in equally divided doses at all sites. The needles were removed, hemostasis obtained and needle entry sites covered with a sterile dressing.  The patient tolerated the procedure well and was then taken to recover and discharged home in good condition.    PLAN: The pt will follow up in the office in one to two months' time to report their results with the procedure.

## 2023-12-12 NOTE — H&P
History Of Present Illness  Marysol Hill is a 83 y.o. female presenting with lumbar spondylosis.     Past Medical History  Past Medical History:   Diagnosis Date    Personal history of other diseases of the circulatory system     History of abnormal electrocardiography    Personal history of other diseases of the circulatory system 08/31/2019    History of sinus bradycardia    Personal history of other endocrine, nutritional and metabolic disease     History of hypercholesterolemia       Surgical History  Past Surgical History:   Procedure Laterality Date    APPENDECTOMY  01/13/2016    Appendectomy    HYSTERECTOMY  01/13/2016    Hysterectomy    OTHER SURGICAL HISTORY  01/13/2016    Parathyroid Resection    OTHER SURGICAL HISTORY  10/28/2020    Pacemaker insertion        Social History  She reports that she has quit smoking. Her smoking use included cigarettes. She has never used smokeless tobacco. She reports current alcohol use. She reports that she does not use drugs.    Family History  Family History   Problem Relation Name Age of Onset    Alzheimer's disease Mother      Dementia Mother      Other (CVA) Father          Allergies  Mushroom    Review of Systems   Constitutional:  Negative for chills, diaphoresis and fever.   HENT:  Negative for ear discharge and tinnitus.    Eyes:  Negative for discharge.   Respiratory:  Negative for cough, chest tightness, shortness of breath and wheezing.    Cardiovascular:  Negative for chest pain.   Gastrointestinal:  Negative for blood in stool, constipation, diarrhea, nausea and vomiting.   Endocrine: Negative for polyuria.   Genitourinary:  Negative for difficulty urinating.   Musculoskeletal:  Positive for arthralgias, back pain, gait problem, neck pain and neck stiffness.   Skin:  Negative for rash.   Neurological:  Positive for numbness. Negative for dizziness, seizures, speech difficulty and weakness.        Physical Exam  Constitutional:       Appearance: Normal  appearance.   HENT:      Head: Normocephalic.      Nose: Nose normal.      Mouth/Throat:      Mouth: Mucous membranes are moist.      Pharynx: Oropharynx is clear.   Eyes:      Extraocular Movements: Extraocular movements intact.      Conjunctiva/sclera: Conjunctivae normal.      Pupils: Pupils are equal, round, and reactive to light.   Cardiovascular:      Rate and Rhythm: Normal rate.   Pulmonary:      Effort: Pulmonary effort is normal. No respiratory distress.      Breath sounds: No wheezing.   Chest:      Chest wall: No tenderness.   Abdominal:      Palpations: Abdomen is soft.   Musculoskeletal:      Cervical back: No rigidity.   Skin:     General: Skin is warm and dry.      Findings: No rash.   Neurological:      Mental Status: She is alert and oriented to person, place, and time.      Sensory: Sensory deficit present.      Motor: Weakness present.      Gait: Gait abnormal.   Psychiatric:         Mood and Affect: Mood normal.         Behavior: Behavior normal.          Last Recorded Vitals  Blood pressure 123/59, pulse 65, temperature 36.4 °C (97.5 °F), resp. rate 14, SpO2 99 %.       Assessment/Plan   1. Lumbar spondylosis        2. Chronic low back pain, unspecified back pain laterality, unspecified whether sciatica present           Lumbar medial branch blocks bilaterally.     Mike Matthews MD

## 2023-12-26 ENCOUNTER — HOSPITAL ENCOUNTER (OUTPATIENT)
Dept: CARDIOLOGY | Facility: HOSPITAL | Age: 83
Discharge: HOME | End: 2023-12-26
Payer: MEDICARE

## 2023-12-26 ENCOUNTER — HOSPITAL ENCOUNTER (OUTPATIENT)
Dept: RADIOLOGY | Facility: HOSPITAL | Age: 83
Discharge: HOME | End: 2023-12-26
Payer: MEDICARE

## 2023-12-26 VITALS
RESPIRATION RATE: 17 BRPM | HEART RATE: 68 BPM | DIASTOLIC BLOOD PRESSURE: 67 MMHG | OXYGEN SATURATION: 98 % | SYSTOLIC BLOOD PRESSURE: 134 MMHG

## 2023-12-26 DIAGNOSIS — Z95.0 PACEMAKER: ICD-10-CM

## 2023-12-26 DIAGNOSIS — K86.2 CYST OF PANCREAS (HHS-HCC): ICD-10-CM

## 2023-12-26 DIAGNOSIS — R41.3 MEMORY LOSS: ICD-10-CM

## 2023-12-26 PROCEDURE — 93286 PERI-PX EVAL PM/LDLS PM IP: CPT | Performed by: STUDENT IN AN ORGANIZED HEALTH CARE EDUCATION/TRAINING PROGRAM

## 2023-12-26 PROCEDURE — 74183 MRI ABD W/O CNTR FLWD CNTR: CPT

## 2023-12-26 PROCEDURE — A9575 INJ GADOTERATE MEGLUMI 0.1ML: HCPCS | Performed by: INTERNAL MEDICINE

## 2023-12-26 PROCEDURE — 74183 MRI ABD W/O CNTR FLWD CNTR: CPT | Performed by: RADIOLOGY

## 2023-12-26 PROCEDURE — 93286 PERI-PX EVAL PM/LDLS PM IP: CPT

## 2023-12-26 PROCEDURE — 76376 3D RENDER W/INTRP POSTPROCES: CPT | Performed by: RADIOLOGY

## 2023-12-26 PROCEDURE — 2550000001 HC RX 255 CONTRASTS: Performed by: INTERNAL MEDICINE

## 2023-12-26 RX ORDER — GADOTERATE MEGLUMINE 376.9 MG/ML
10 INJECTION INTRAVENOUS
Status: COMPLETED | OUTPATIENT
Start: 2023-12-26 | End: 2023-12-26

## 2023-12-26 RX ORDER — DONEPEZIL HYDROCHLORIDE 10 MG/1
10 TABLET, FILM COATED ORAL NIGHTLY
Qty: 90 TABLET | Refills: 2 | Status: SHIPPED | OUTPATIENT
Start: 2023-12-26 | End: 2023-12-27 | Stop reason: SDUPTHER

## 2023-12-26 RX ADMIN — GADOTERATE MEGLUMINE 10 ML: 376.9 INJECTION INTRAVENOUS at 12:52

## 2023-12-26 NOTE — NURSING NOTE
The device clinical nurse arrived checked patient's pacemaker, interrogated it and set it at DOO 70 beats per minutes, per device nurse. Patient voiced no c/o pain or discomfort ad when asked if she is ok patient stated yes. MARIETTA

## 2023-12-26 NOTE — NURSING NOTE
8334 Device clinic nurse returns to MRI to reset pt.pacemaker to previous settings. Patient denies chest pain or dizzines, discharged in stable condition. BL

## 2023-12-26 NOTE — NURSING NOTE
Patient is alert and able to make needs known. Has pacemaker and is presently waiting on the device clinical nurse to check pacemaker and set it into MRI safe mode for MRI. MARIETTA

## 2023-12-27 DIAGNOSIS — R41.3 MEMORY LOSS: ICD-10-CM

## 2023-12-28 ENCOUNTER — EVALUATION (OUTPATIENT)
Dept: PHYSICAL THERAPY | Facility: CLINIC | Age: 83
End: 2023-12-28
Payer: MEDICARE

## 2023-12-28 DIAGNOSIS — M54.9 BACK PAIN: Primary | ICD-10-CM

## 2023-12-28 DIAGNOSIS — M47.816 LUMBAR SPONDYLOSIS: ICD-10-CM

## 2023-12-28 PROCEDURE — 97110 THERAPEUTIC EXERCISES: CPT | Mod: GP | Performed by: PHYSICAL THERAPIST

## 2023-12-28 RX ORDER — DONEPEZIL HYDROCHLORIDE 10 MG/1
10 TABLET, FILM COATED ORAL NIGHTLY
Qty: 90 TABLET | Refills: 2 | Status: SHIPPED | OUTPATIENT
Start: 2023-12-28 | End: 2024-12-27

## 2023-12-28 ASSESSMENT — ENCOUNTER SYMPTOMS
DEPRESSION: 0
LOSS OF SENSATION IN FEET: 0
OCCASIONAL FEELINGS OF UNSTEADINESS: 0

## 2023-12-28 ASSESSMENT — PATIENT HEALTH QUESTIONNAIRE - PHQ9
SUM OF ALL RESPONSES TO PHQ9 QUESTIONS 1 AND 2: 0
2. FEELING DOWN, DEPRESSED OR HOPELESS: NOT AT ALL
1. LITTLE INTEREST OR PLEASURE IN DOING THINGS: NOT AT ALL

## 2023-12-28 NOTE — LETTER
January 31, 2024    Keyanna Valadez MD  [address] [facility] [address]  [facility] [address]    Patient: Marysol Hill   YOB: 1940   Date of Visit: 12/28/2023       Dear No referring provider defined for this encounter.    The attached plan of care is being sent to you because your patient’s medical reimbursement requires that you certify the plan of care. Your signature is required to allow uninterrupted insurance coverage.      You may indicate your approval by signing below and faxing this form back to us at Dept Fax: [phone].    Please call Dept: [phone] with any questions or concerns.    Thank you for this referral,        Deb Hummel, PT  [facility] [address] [address] [facility]  [address] [facility] [address]    Payer: Payor: MEDICARE / Plan: MEDICARE PART A AND B / Product Type: *No Product type* /                                                                         Date:     Dear Deb Hummel PT,     Re: Ms. Marysol Hill, MRN:[id number]    I certify that I have reviewed the attached plan of care and it is medically necessary for Ms. Marysol Hill (1940) who is under my care.          ______________________________________                    _________________  Provider name and credentials                                           Date and time                                                                                           Plan of Care 12/28/23   Effective from: 12/28/2023  Effective to: 3/27/2024    Plan ID: [id number]                Participants as of Finalize on 12/28/2023      Name Type Comments Contact Info    Deb Hummel PT Physical Therapist  [phone]    Keyanna Valadez MD PCP - General  [phone]           Last Plan Note       Author: Deb Hummel PT Status: Incomplete Last edited: 12/28/2023  2:30 PM         Patient Name Marysol Hill  MRN: [id number]  Today's Date: 12/28/23  Time Calculation  Start Time:  023  Stop Time: 315  Time Calculation (min): 45 min    Insurance:   Visit #: 1  Insurance Reviewed  [facility] $1089 cap money used)  (per information provided by  pre-cert team)   [facility] Certification date range:  23/    Therapy Diagnoses:   1. Back pain  PT eval and treat      2. Lumbar spondylosis          General:  Reason for visit: LS pain   Referred by: Jyothi Crain MD appt:  no follow up scheduled  Preferred Name:  Marysol  Script:  PT  Onset Date:  21  Most recent assessment/re-assessment:  23  Email/phone #:  prefers printed copy of exercises  Access Code: [email or web address]    Assessment:    Evolving with changing characteristics  Level of complexity:  low  Patient with flare up of OA in the LS, needs work on/Skilled PT for ROM, flexibility, dynamic stabilization, strength, posture, body mechanics and gait/stairs for improved overall function.       Problems:    Pain:  __x_  Posture/Body mechanics deficits:  _x__  Decreased knowledge HEP:  __x_  Decreased knowledge of Precautions:  ___  Activity Limitations:   _x__  ADL's/IADL's/Self-care skills:  __x_  ROM/Joint Mobility:  __x_  Strength:  __x_  Decreased functional level:   __x_  Flexibility:  _x__  Tenderness/decreased soft tissue mobility:  _x__  Gait/Stairs:  __x_  Fall Risk:  ___  Balance:  ___  Edema:  ___  Participation restrictions:  ___  Sensory:  ___  Transfers:  ___  Decreased knowledge of brace:   ___  Other:  ___    X Indicates included in problem list    Goals:    STG:    -Increased postural awareness  -Compliant with HEP.   LTG:  by discharge  -Increased postural awareness and posture WFL.  - pain to:  2/10 and patient I with self management of symptoms.   -Decreased pain and increased function with ADL's and IADL's.  Improve Oswestry  to: 10%  limitation of function.  -Normal gait on level and uneven surfaces community level distances for improved function in the community.  -Increase trunk AROM to WFL for improved function  with chores.    -Increase trunk strength and stability and R/L LE strength to WFL for improved function with chores.  -Increase B LE flexibility to WFL.    -Decrease B /lower quarter tenderness 50-75% per patient report.  -I and compliant with HEP and proper: LE/lower back care/I with proper aquatics program with supervision from .     Rehab potential to achieve the above goals is good.    Patient is aware of diagnosis and prognosis and agrees with established plan of care and goals.    Plan:   Skilled PT 1-2 x/week for 12 weeks( Until goals achieved, maximum rehab potential has been achieved, or patient has plateaued)  for:    Aquatics  __x___  CP   __x__  Dry needling  ____  Education  __x__  Electrical Stimulation  ____  Gait training  __x__  HEP  __x__  Manual  ____  Mechanical Traction  ____  NMR  ___x_  Self-care/home management  __x__  Therapeutic Exercise   __x__  Therapeutic Activities  __x__  US  _x___  Work Conditioning  ____  Re-assessment  __x__  Other  ____    X Indicates included in treatment plan    PT for Nu-step for functional mobility and soft tissue warm up for more efficient stretching, work on ROM, flexibility, dynamic stabilization, strength, posture, body mechanics and gait/stairs for improved overall function.  Manual and Pulsed US prn, no e-stim as patient has a pacemaker.     Subjective:  HPI: patient reports onset of LS pain about 2.5 years ago/no apparent cause.  Patient has had injections from pain management that have not helped and patient is referred to outpatient PT.  Patient is referred for outpatient PT.  Patient had PT at [facility]  and [facility] PT did not help.  Patient is referred for aquatic exercises.      Pain  Type of pain:  R > L LS into B buttocks constant ache:  6/10  What increases pain: standing  What decreases pain:  sitting/LS brace around the house/temporary relief with heat.      Medical Hx/  Fall Risk:  no  Precautions: lung disease/osteoporosis/breathing  "machine/high cholesterol, pacemaker, parathyroid surgery, memory loss, see meds in chart/NO E-STIM    Human Trafficking risk:  No    Patient goal:  Decreased pain and increased function.   Patient is aware of diagnosis and prognosis.    Living Environment:  ranch with 2 access steps  Social Support:  lives with:    DME:   grab bars and high toilet seats.        Prior Function:   about the same for the last year    Function:    A and O x 3  Sleep:  WFL, supine with heating pad, instructed in proper postures.  ADL's:  WFL.    Chores:   takes breaks when ironing.    Driving:  per patient's , she does not drive, some pain with transfers.    Work:  retired  Recreation:  women's clubs without difficulty, member of [employer], walks in [place] in the summer.    Sittin hours Standing:   10'  Walkin-25'    Objective:    Outcome Measures:  Other Measures  Oswestry Disablity Index (MENDY): 20% (1050:  20% limitation of function.)     Posture:  flat LS and pelvic landmarks symmetrical.      Gait/Stairs: moderately decreased trunk rotation and hip extension.  Reciprocal pattern on stairs.      Palpation:  moderate tenderness B lumbar paraspinals and piriformis.      ROM:  Trunk Flexion:  2\" 3rd to floor  Extension: 0/20  RSB:  2\" 3rd to fibular head  LSB:  2\" 3rd to fibular head  RR: 50%  LR: 50%    MMT:  Abd:  3-/5  Bridge: 25%  B LE myotomes:  WFL and symmetrical    Flexibility:  Hamstrings:  90/90:  R:  -11  L: -18  Heelcords: 0 B  Hip flexors: min/mod B  Gluts: min B  Piriformis: min B  Treatment:    Evaluation:  30 minutes    **= HEP  NV= Next visit  np= not performed  nb= non-billable  G= group HEP= discharged to [facility]    Therapeutic Exercise: 8  Nu-step(subjective taken/education):    NV    Standing hams and hip flexor/calf stretches: NV    Seated flexion stretch:  NV    LTR:  x 15/5\"**  B SKTC:  10/10\"**  B supine piriformis stretch:  3/30\"**    NMR:  2  T-band 3-way pull downs:  " "NV  T-band obliques: NV  T-band B pull for/back:  NV    Pelvic tilt:  10/5\"**  Bridges:  NV  PT hooklying with add set:  NV  PT hooklying with t-band abd:  NV     Education:  poc, anatomy, physiology, posture, body mechanics, safety awareness, HEP.  Preferred learning:  pictures, demonstration.  Demonstrated good understanding.     Access Code: [email or web address]  URL: [email or web address]  Date: 12/28/2023  Prepared by: Deb Hummel    Exercises  - Supine Lower Trunk Rotation  - 1 x daily - 7 x weekly - 2-3 sets - 10 reps  - Hooklying Single Knee to Chest Stretch  - 1 x daily - 7 x weekly - 1 sets - 10 reps - 10 second hold  - Supine Piriformis Stretch with Foot on Ground  - 1 x daily - 7 x weekly - 1 sets - 3 reps - 30 second hold  - Pelvic Tilt  - 1 x daily - 7 x weekly - 2-3 sets - 10 reps - 5 second hold  - Seated Lumbar Flexion Stretch  - 1 x daily - 7 x weekly - 1 sets - 10 reps - 10 second hold                                             Current Participants as of 1/31/2024      Name Type Comments Contact Info    Keyanna Valadez MD PCP - General  [phone]    Signature pending    Deb Hummel PT Physical Therapist  [phone]    Electronically signed by Deb Hummel PT at 12/28/2023 1526 EST        "

## 2023-12-28 NOTE — LETTER
December 28, 2023    [place]  [address] [address] [facility] [address]  [facility] [address]    Patient: Marysol Hill   YOB: 1940   Date of Visit: 12/28/2023       Dear No referring provider defined for this encounter.    The attached plan of care is being sent to you because your patient’s medical reimbursement requires that you certify the plan of care. Your signature is required to allow uninterrupted insurance coverage.      You may indicate your approval by signing below and faxing this form back to us at Dept Fax: [phone].    Please call Dept: [phone] with any questions or concerns.    Thank you for this referral,        Deb Hummel, CHITO  [facility] [address] [facility]  [address] [facility] [address]    Payer: Payor: MEDICARE / Plan: MEDICARE PART A AND B / Product Type: *No Product type* /                                                                         Date:     Dear Deb Hummel PT,     Re: Ms. Marysol Hill, MRN:[id number]    I certify that I have reviewed the attached plan of care and it is medically necessary for Ms. Marysol Hill (1940) who is under my care.          ______________________________________                    _________________  Provider name and credentials                                           Date and time                                                                                           Plan of Care 12/28/23   Effective from: 12/28/2023  Effective to: 3/27/2024    Plan ID: [id number]            Participants as of Finalize on 12/28/2023    Name Type Comments Contact Info    Deb Hummel PT Physical Therapist  [phone]    Keyanna Valadez MD PCP - General  [phone]       Last Plan Note     Author: Deb Hummel PT Status: Incomplete Last edited: 12/28/2023  2:30 PM       Patient Name Marysol Hill  MRN: [id number]  Today's Date: 12/28/23  Time Calculation  Start Time:  023  Stop Time: 315  Time Calculation (min): 45 min    Insurance:   Visit #: 1  Insurance Reviewed  [facility] $1089 cap money used)  (per information provided by  pre-cert team)   [facility] Certification date range:  23/    Therapy Diagnoses:   1. Back pain  PT eval and treat      2. Lumbar spondylosis          General:  Reason for visit: LS pain   Referred by: Jyothi Crain MD appt:  no follow up scheduled  Preferred Name:  Marysol  Script:  PT  Onset Date:  21  Most recent assessment/re-assessment:  23  Email/phone #:  prefers printed copy of exercises  Access Code: [email or web address]    Assessment:    Evolving with changing characteristics  Level of complexity:  low  Patient with flare up of OA in the LS, needs work on/Skilled PT for ROM, flexibility, dynamic stabilization, strength, posture, body mechanics and gait/stairs for improved overall function.       Problems:    Pain:  __x_  Posture/Body mechanics deficits:  _x__  Decreased knowledge HEP:  __x_  Decreased knowledge of Precautions:  ___  Activity Limitations:   _x__  ADL's/IADL's/Self-care skills:  __x_  ROM/Joint Mobility:  __x_  Strength:  __x_  Decreased functional level:   __x_  Flexibility:  _x__  Tenderness/decreased soft tissue mobility:  _x__  Gait/Stairs:  __x_  Fall Risk:  ___  Balance:  ___  Edema:  ___  Participation restrictions:  ___  Sensory:  ___  Transfers:  ___  Decreased knowledge of brace:   ___  Other:  ___    X Indicates included in problem list    Goals:    STG:    -Increased postural awareness  -Compliant with HEP.   LTG:  by discharge  -Increased postural awareness and posture WFL.  - pain to:  2/10 and patient I with self management of symptoms.   -Decreased pain and increased function with ADL's and IADL's.  Improve Oswestry  to: 10%  limitation of function.  -Normal gait on level and uneven surfaces community level distances for improved function in the community.  -Increase trunk AROM to WFL for improved function  with chores.    -Increase trunk strength and stability and R/L LE strength to WFL for improved function with chores.  -Increase B LE flexibility to WFL.    -Decrease B /lower quarter tenderness 50-75% per patient report.  -I and compliant with HEP and proper: LE/lower back care/I with proper aquatics program with supervision from .     Rehab potential to achieve the above goals is good.    Patient is aware of diagnosis and prognosis and agrees with established plan of care and goals.    Plan:   Skilled PT 1-2 x/week for 12 weeks( Until goals achieved, maximum rehab potential has been achieved, or patient has plateaued)  for:    Aquatics  __x___  CP   __x__  Dry needling  ____  Education  __x__  Electrical Stimulation  ____  Gait training  __x__  HEP  __x__  Manual  ____  Mechanical Traction  ____  NMR  ___x_  Self-care/home management  __x__  Therapeutic Exercise   __x__  Therapeutic Activities  __x__  US  _x___  Work Conditioning  ____  Re-assessment  __x__  Other  ____    X Indicates included in treatment plan    PT for Nu-step for functional mobility and soft tissue warm up for more efficient stretching, work on ROM, flexibility, dynamic stabilization, strength, posture, body mechanics and gait/stairs for improved overall function.  Manual and Pulsed US prn, no e-stim as patient has a pacemaker.     Subjective:  HPI: patient reports onset of LS pain about 2.5 years ago/no apparent cause.  Patient has had injections from pain management that have not helped and patient is referred to outpatient PT.  Patient is referred for outpatient PT.  Patient had PT at [facility]  and [facility] PT did not help.  Patient is referred for aquatic exercises.      Pain  Type of pain:  R > L LS into B buttocks constant ache:  6/10  What increases pain: standing  What decreases pain:  sitting/LS brace around the house/temporary relief with heat.      Medical Hx/  Fall Risk:  no  Precautions: lung disease/osteoporosis/breathing  "machine/high cholesterol, pacemaker, parathyroid surgery, memory loss, see meds in chart/NO E-STIM    Human Trafficking risk:  No    Patient goal:  Decreased pain and increased function.   Patient is aware of diagnosis and prognosis.    Living Environment:  ranch with 2 access steps  Social Support:  lives with:    DME:   grab bars and high toilet seats.        Prior Function:   about the same for the last year    Function:    A and O x 3  Sleep:  WFL, supine with heating pad, instructed in proper postures.  ADL's:  WFL.    Chores:   takes breaks when ironing.    Driving:  per patient's , she does not drive, some pain with transfers.    Work:  retired  Recreation:  women's clubs without difficulty, member of [employer], walks in [place] in the summer.    Sittin hours Standing:   10'  Walkin-25'    Objective:    Outcome Measures:  Other Measures  Oswestry Disablity Index (MENDY): 20% (1050:  20% limitation of function.)     Posture:  flat LS and pelvic landmarks symmetrical.      Gait/Stairs: moderately decreased trunk rotation and hip extension.  Reciprocal pattern on stairs.      Palpation:  moderate tenderness B lumbar paraspinals and piriformis.      ROM:  Trunk Flexion:  2\" 3rd to floor  Extension: 0/20  RSB:  2\" 3rd to fibular head  LSB:  2\" 3rd to fibular head  RR: 50%  LR: 50%    MMT:  Abd:  3-/5  Bridge: 25%  B LE myotomes:  WFL and symmetrical    Flexibility:  Hamstrings:  90/90:  R:  -11  L: -18  Heelcords: 0 B  Hip flexors: min/mod B  Gluts: min B  Piriformis: min B  Treatment:    Evaluation:  30 minutes    **= HEP  NV= Next visit  np= not performed  nb= non-billable  G= group HEP= discharged to [facility]    Therapeutic Exercise: 8  Nu-step(subjective taken/education):    NV    Standing hams and hip flexor/calf stretches: NV    Seated flexion stretch:  NV    LTR:  x 15/5\"**  B SKTC:  10/10\"**  B supine piriformis stretch:  3/30\"**    NMR:  2  T-band 3-way pull downs:  " "NV  T-band obliques: NV  T-band B pull for/back:  NV    Pelvic tilt:  10/5\"**  Bridges:  NV  PT hooklying with add set:  NV  PT hooklying with t-band abd:  NV     Education:  poc, anatomy, physiology, posture, body mechanics, safety awareness, HEP.  Preferred learning:  pictures, demonstration.  Demonstrated good understanding.     Access Code: [email or web address]  URL: [email or web address]  Date: 12/28/2023  Prepared by: Deb Hummel    Exercises  - Supine Lower Trunk Rotation  - 1 x daily - 7 x weekly - 2-3 sets - 10 reps  - Hooklying Single Knee to Chest Stretch  - 1 x daily - 7 x weekly - 1 sets - 10 reps - 10 second hold  - Supine Piriformis Stretch with Foot on Ground  - 1 x daily - 7 x weekly - 1 sets - 3 reps - 30 second hold  - Pelvic Tilt  - 1 x daily - 7 x weekly - 2-3 sets - 10 reps - 5 second hold  - Seated Lumbar Flexion Stretch  - 1 x daily - 7 x weekly - 1 sets - 10 reps - 10 second hold                                             Current Participants as of 12/28/2023    Name Type Comments Contact Info    Deb Hummel, PT Physical Therapist  [phone]    Signature pending    Keyanna Valadez MD PCP - General  [phone]    Signature pending      "

## 2023-12-28 NOTE — LETTER
January 5, 2024    Keyanna Valadez MD  [address]  [facility] [address]    Patient: Marysol Hill   YOB: 1940   Date of Visit: 12/28/2023       Dear No referring provider defined for this encounter.    The attached plan of care is being sent to you because your patient’s medical reimbursement requires that you certify the plan of care. Your signature is required to allow uninterrupted insurance coverage.      You may indicate your approval by signing below and faxing this form back to us at Dept Fax: [phone].    Please call Dept: [phone] with any questions or concerns.    Thank you for this referral,        Deb Hummel, PT  [facility] [address] [facility]  [address] [facility] [address]    Payer: Payor: MEDICARE / Plan: MEDICARE PART A AND B / Product Type: *No Product type* /                                                                         Date:     Dear Deb Hummel PT,     Re: Ms. Marysol Hill, MRN:[id number]    I certify that I have reviewed the attached plan of care and it is medically necessary for Ms. Marysol Hill (1940) who is under my care.          ______________________________________                    _________________  Provider name and credentials                                           Date and time                                                                                           Plan of Care 12/28/23   Effective from: 12/28/2023  Effective to: 3/27/2024    Plan ID: [id number]                Participants as of Finalize on 12/28/2023      Name Type Comments Contact Info    Deb Hummel PT Physical Therapist  [phone]    Keyanna Valadez MD PCP - General  [phone]           Last Plan Note       Author: Deb Hummel PT Status: Incomplete Last edited: 12/28/2023  2:30 PM         Patient Name Marysol Hill  MRN: [id number]  Today's Date: 12/28/23  Time Calculation  Start Time:  023  Stop Time: 315  Time Calculation (min): 45 min    Insurance:   Visit #: 1  Insurance Reviewed  [facility] $1089 cap money used)  (per information provided by  pre-cert team)   [facility] Certification date range:  23/    Therapy Diagnoses:   1. Back pain  PT eval and treat      2. Lumbar spondylosis          General:  Reason for visit: LS pain   Referred by: Jyothi Crain MD appt:  no follow up scheduled  Preferred Name:  Marysol  Script:  PT  Onset Date:  21  Most recent assessment/re-assessment:  23  Email/phone #:  prefers printed copy of exercises  Access Code: [email or web address]    Assessment:    Evolving with changing characteristics  Level of complexity:  low  Patient with flare up of OA in the LS, needs work on/Skilled PT for ROM, flexibility, dynamic stabilization, strength, posture, body mechanics and gait/stairs for improved overall function.       Problems:    Pain:  __x_  Posture/Body mechanics deficits:  _x__  Decreased knowledge HEP:  __x_  Decreased knowledge of Precautions:  ___  Activity Limitations:   _x__  ADL's/IADL's/Self-care skills:  __x_  ROM/Joint Mobility:  __x_  Strength:  __x_  Decreased functional level:   __x_  Flexibility:  _x__  Tenderness/decreased soft tissue mobility:  _x__  Gait/Stairs:  __x_  Fall Risk:  ___  Balance:  ___  Edema:  ___  Participation restrictions:  ___  Sensory:  ___  Transfers:  ___  Decreased knowledge of brace:   ___  Other:  ___    X Indicates included in problem list    Goals:    STG:    -Increased postural awareness  -Compliant with HEP.   LTG:  by discharge  -Increased postural awareness and posture WFL.  - pain to:  2/10 and patient I with self management of symptoms.   -Decreased pain and increased function with ADL's and IADL's.  Improve Oswestry  to: 10%  limitation of function.  -Normal gait on level and uneven surfaces community level distances for improved function in the community.  -Increase trunk AROM to WFL for improved function  with chores.    -Increase trunk strength and stability and R/L LE strength to WFL for improved function with chores.  -Increase B LE flexibility to WFL.    -Decrease B /lower quarter tenderness 50-75% per patient report.  -I and compliant with HEP and proper: LE/lower back care/I with proper aquatics program with supervision from .     Rehab potential to achieve the above goals is good.    Patient is aware of diagnosis and prognosis and agrees with established plan of care and goals.    Plan:   Skilled PT 1-2 x/week for 12 weeks( Until goals achieved, maximum rehab potential has been achieved, or patient has plateaued)  for:    Aquatics  __x___  CP   __x__  Dry needling  ____  Education  __x__  Electrical Stimulation  ____  Gait training  __x__  HEP  __x__  Manual  ____  Mechanical Traction  ____  NMR  ___x_  Self-care/home management  __x__  Therapeutic Exercise   __x__  Therapeutic Activities  __x__  US  _x___  Work Conditioning  ____  Re-assessment  __x__  Other  ____    X Indicates included in treatment plan    PT for Nu-step for functional mobility and soft tissue warm up for more efficient stretching, work on ROM, flexibility, dynamic stabilization, strength, posture, body mechanics and gait/stairs for improved overall function.  Manual and Pulsed US prn, no e-stim as patient has a pacemaker.     Subjective:  HPI: patient reports onset of LS pain about 2.5 years ago/no apparent cause.  Patient has had injections from pain management that have not helped and patient is referred to outpatient PT.  Patient is referred for outpatient PT.  Patient had PT at [facility]  and [facility] did not help.  Patient is referred for aquatic exercises.      Pain  Type of pain:  R > L LS into B buttocks constant ache:  6/10  What increases pain: standing  What decreases pain:  sitting/LS brace around the house/temporary relief with heat.      Medical Hx/  Fall Risk:  no  Precautions: lung disease/osteoporosis/breathing  "machine/high cholesterol, pacemaker, parathyroid surgery, memory loss, see meds in chart/NO E-STIM    Human Trafficking risk:  No    Patient goal:  Decreased pain and increased function.   Patient is aware of diagnosis and prognosis.    Living Environment:  ranch with 2 access steps  Social Support:  lives with:    DME:   grab bars and high toilet seats.        Prior Function:   about the same for the last year    Function:    A and O x 3  Sleep:  WFL, supine with heating pad, instructed in proper postures.  ADL's:  WFL.    Chores:   takes breaks when ironing.    Driving:  per patient's , she does not drive, some pain with transfers.    Work:  retired  Recreation:  women's clubs without difficulty, member of [place], walks in [place] in the summer.    Sittin hours Standing:   10'  Walkin-25'    Objective:    Outcome Measures:  Other Measures  Oswestry Disablity Index (MENDY): 20% (1050:  20% limitation of function.)     Posture:  flat LS and pelvic landmarks symmetrical.      Gait/Stairs: moderately decreased trunk rotation and hip extension.  Reciprocal pattern on stairs.      Palpation:  moderate tenderness B lumbar paraspinals and piriformis.      ROM:  Trunk Flexion:  2\" 3rd to floor  Extension: 0/20  RSB:  2\" 3rd to fibular head  LSB:  2\" 3rd to fibular head  RR: 50%  LR: 50%    MMT:  Abd:  3-/5  Bridge: 25%  B LE myotomes:  WFL and symmetrical    Flexibility:  Hamstrings:  90/90:  R:  -11  L: -18  Heelcords: 0 B  Hip flexors: min/mod B  Gluts: min B  Piriformis: min B  Treatment:    Evaluation:  30 minutes    **= HEP  NV= Next visit  np= not performed  nb= non-billable  G= group HEP= discharged to [facility]    Therapeutic Exercise: 8  Nu-step(subjective taken/education):    NV    Standing hams and hip flexor/calf stretches: NV    Seated flexion stretch:  NV    LTR:  x 15/5\"**  B SKTC:  10/10\"**  B supine piriformis stretch:  3/30\"**    NMR:  2  T-band 3-way pull downs:  " "NV  T-band obliques: NV  T-band B pull for/back:  NV    Pelvic tilt:  10/5\"**  Bridges:  NV  PT hooklying with add set:  NV  PT hooklying with t-band abd:  NV     Education:  poc, anatomy, physiology, posture, body mechanics, safety awareness, HEP.  Preferred learning:  pictures, demonstration.  Demonstrated good understanding.     Access Code: [email or web address]  URL: [email or web address]  Date: 12/28/2023  Prepared by: Deb Hummel    Exercises  - Supine Lower Trunk Rotation  - 1 x daily - 7 x weekly - 2-3 sets - 10 reps  - Hooklying Single Knee to Chest Stretch  - 1 x daily - 7 x weekly - 1 sets - 10 reps - 10 second hold  - Supine Piriformis Stretch with Foot on Ground  - 1 x daily - 7 x weekly - 1 sets - 3 reps - 30 second hold  - Pelvic Tilt  - 1 x daily - 7 x weekly - 2-3 sets - 10 reps - 5 second hold  - Seated Lumbar Flexion Stretch  - 1 x daily - 7 x weekly - 1 sets - 10 reps - 10 second hold                                             Current Participants as of 1/5/2024      Name Type Comments Contact Info    Keyanna Valadez MD PCP - General  [phone]    Signature pending    Deb Hummel PT Physical Therapist  [phone]    Electronically signed by Deb Hummel PT at 12/28/2023 1526 EST        "

## 2023-12-28 NOTE — PROGRESS NOTES
Patient Name Marysol Hill  MRN: [id number]  Today's Date: 23  Time Calculation  Start Time: 0230  Stop Time: 315  Time Calculation (min): 45 min    Insurance:   Visit #: 1  Insurance Reviewed  [facility] $1089 cap money used)  (per information provided by  pre-cert team)   [facility] Certification date range:  23/3-27-24    Therapy Diagnoses:   1. Back pain  PT eval and treat      2. Lumbar spondylosis          General:  Reason for visit: LS pain   Referred by: Jyothi Crain MD appt:  no follow up scheduled  Preferred Name:  Marysol  Script:  PT  Onset Date:  21  Most recent assessment/re-assessment:  23  Email/phone #:  prefers printed copy of exercises  Access Code: [email or web address]    Assessment:    Evolving with changing characteristics  Level of complexity:  low  Patient with flare up of OA in the LS, needs work on/Skilled PT for ROM, flexibility, dynamic stabilization, strength, posture, body mechanics and gait/stairs for improved overall function.       Problems:    Pain:  __x_  Posture/Body mechanics deficits:  _x__  Decreased knowledge HEP:  __x_  Decreased knowledge of Precautions:  ___  Activity Limitations:   _x__  ADL's/IADL's/Self-care skills:  __x_  ROM/Joint Mobility:  __x_  Strength:  __x_  Decreased functional level:   __x_  Flexibility:  _x__  Tenderness/decreased soft tissue mobility:  _x__  Gait/Stairs:  __x_  Fall Risk:  ___  Balance:  ___  Edema:  ___  Participation restrictions:  ___  Sensory:  ___  Transfers:  ___  Decreased knowledge of brace:   ___  Other:  ___    X Indicates included in problem list    Goals:    STG:    -Increased postural awareness  -Compliant with HEP.   LTG:  by discharge  -Increased postural awareness and posture WFL.  - pain to:  2/10 and patient I with self management of symptoms.   -Decreased pain and increased function with ADL's and IADL's.  Improve Oswestry  to: 10%  limitation of function.  -Normal gait on level and uneven surfaces community  level distances for improved function in the community.  -Increase trunk AROM to WFL for improved function with chores.    -Increase trunk strength and stability and R/L LE strength to WFL for improved function with chores.  -Increase B LE flexibility to WFL.    -Decrease B /lower quarter tenderness 50-75% per patient report.  -I and compliant with HEP and proper: LE/lower back care/I with proper aquatics program with supervision from .     Rehab potential to achieve the above goals is good.    Patient is aware of diagnosis and prognosis and agrees with established plan of care and goals.    Plan:   Skilled PT 1-2 x/week for 12 weeks( Until goals achieved, maximum rehab potential has been achieved, or patient has plateaued)  for:    Aquatics  __x___  CP   __x__  Dry needling  ____  Education  __x__  Electrical Stimulation  ____  Gait training  __x__  HEP  __x__  Manual  ____  Mechanical Traction  ____  NMR  ___x_  Self-care/home management  __x__  Therapeutic Exercise   __x__  Therapeutic Activities  __x__  US  _x___  Work Conditioning  ____  Re-assessment  __x__  Other  ____    X Indicates included in treatment plan    PT for aquatics for work on ROM, flexibility, dynamic stabilization, strength, posture, body mechanics and gait/stairs for improved overall function.  Manual and Pulsed US prn, no e-stim as patient has a pacemaker.     Subjective:  HPI: patient reports onset of LS pain about 2.5 years ago/no apparent cause.  Patient has had injections from pain management that have not helped and patient is referred to outpatient PT.  Patient is referred for outpatient PT.  Patient had PT at [facility]  and land PT did not help.  Patient is referred for aquatic exercises.      Pain  Type of pain:  R > L LS into B buttocks constant ache:  6/10  What increases pain: standing  What decreases pain:  sitting/LS brace around the house/temporary relief with heat.      Medical Hx/  Fall Risk:  no  Precautions: lung  "disease/osteoporosis/breathing machine/high cholesterol, pacemaker, parathyroid surgery, memory loss, see meds in chart/NO E-STIM    Human Trafficking risk:  No    Patient goal:  Decreased pain and increased function.   Patient is aware of diagnosis and prognosis.    Living Environment:  ranch with 2 access steps  Social Support:  lives with:    DME:   grab bars and high toilet seats.        Prior Function:   about the same for the last year    Function:    A and O x 3  Sleep:  WFL, supine with heating pad, instructed in proper postures.  ADL's:  WFL.    Chores:   takes breaks when ironing.    Driving:  per patient's , she does not drive, some pain with transfers.    Work:  retired  Recreation:  women's clubs without difficulty, member of [employer], walks in [place] in the summer.    Sittin hours Standing:   10'  Walkin-25'    Objective:    Outcome Measures:  Other Measures  Oswestry Disablity Index (MENDY): 20% (10/50:  20% limitation of function.)     Posture:  flat LS and pelvic landmarks symmetrical.      Gait/Stairs: moderately decreased trunk rotation and hip extension.  Reciprocal pattern on stairs.      Palpation:  moderate tenderness B lumbar paraspinals and piriformis.      ROM:  Trunk Flexion:  2\" 3rd to floor  Extension: 0/20  RSB:  2\" 3rd to fibular head  LSB:  2\" 3rd to fibular head  RR: 50%  LR: 50%    MMT:  Abd:  3-/5  Bridge: 25%  B LE myotomes:  WFL and symmetrical    Flexibility:  Hamstrings:  90/90:  R:  -11  L: -18  Heelcords: 0 B  Hip flexors: min/mod B  Gluts: min B  Piriformis: min B  Treatment:    Evaluation:  30 minutes    **= HEP  NV= Next visit  np= not performed  nb= non-billable  G= group HEP= discharged to [facility]    Therapeutic Exercise: 8  Nu-step(subjective taken/education):    NV    Standing hams and hip flexor/calf stretches: NV    Seated flexion stretch:  NV    LTR:  x 15/5\"**  B SKTC:  10/10\"**  B supine piriformis stretch:  3/30\"**    NMR:  " "2  T-band 3-way pull downs:  NV  T-band obliques: NV  T-band B pull for/back:  NV    Pelvic tilt:  10/5\"**  Bridges:  NV  PT hooklying with add set:  NV  PT hooklying with t-band abd:  NV     Education:  poc, anatomy, physiology, posture, body mechanics, safety awareness, HEP.  Preferred learning:  pictures, demonstration.  Demonstrated good understanding.     Access Code: [email or web address]  URL: [email or web address]  Date: 12/28/2023  Prepared by: Deb Hummel    Exercises  - Supine Lower Trunk Rotation  - 1 x daily - 7 x weekly - 2-3 sets - 10 reps  - Hooklying Single Knee to Chest Stretch  - 1 x daily - 7 x weekly - 1 sets - 10 reps - 10 second hold  - Supine Piriformis Stretch with Foot on Ground  - 1 x daily - 7 x weekly - 1 sets - 3 reps - 30 second hold  - Pelvic Tilt  - 1 x daily - 7 x weekly - 2-3 sets - 10 reps - 5 second hold  - Seated Lumbar Flexion Stretch  - 1 x daily - 7 x weekly - 1 sets - 10 reps - 10 second hold                                      "

## 2024-01-04 ENCOUNTER — APPOINTMENT (OUTPATIENT)
Dept: PHYSICAL THERAPY | Facility: CLINIC | Age: 84
End: 2024-01-04
Payer: MEDICARE

## 2024-01-04 ENCOUNTER — DOCUMENTATION (OUTPATIENT)
Dept: PHYSICAL THERAPY | Facility: CLINIC | Age: 84
End: 2024-01-04
Payer: MEDICARE

## 2024-01-04 NOTE — PROGRESS NOTES
Physical Therapy                 Therapy Communication Note    Patient Name: Marysol Hill  MRN: [id number]  Today's Date: 1/4/2024     Discipline: Physical Therapy    Missed Visit Reason:  Patient arrived 45 minutes late thinking her appointment was at a later time. Therapist unable to accommodate.     Missed Time: Cancel    Comment:

## 2024-01-10 ENCOUNTER — APPOINTMENT (OUTPATIENT)
Dept: PHYSICAL THERAPY | Facility: CLINIC | Age: 84
End: 2024-01-10
Payer: MEDICARE

## 2024-01-12 ENCOUNTER — TREATMENT (OUTPATIENT)
Dept: PHYSICAL THERAPY | Facility: CLINIC | Age: 84
End: 2024-01-12
Payer: MEDICARE

## 2024-01-12 DIAGNOSIS — M47.816 LUMBAR SPONDYLOSIS: ICD-10-CM

## 2024-01-12 DIAGNOSIS — M54.9 BACK PAIN: ICD-10-CM

## 2024-01-12 PROCEDURE — 97113 AQUATIC THERAPY/EXERCISES: CPT | Mod: GP | Performed by: PHYSICAL THERAPIST

## 2024-01-12 NOTE — PROGRESS NOTES
Physical Therapy  Physical Therapy Progress Note    Patient Name Marysol Hill   MRN: [id number]  Today's Date: 01/12/24  Time Calculation  Start Time: 0120  Stop Time: 0200  Time Calculation (min): 40 min    Insurance:    Visit #: 2  Insurance Reviewed  [facility] $1089 cap money used)  (per information provided by  pre-cert team)   [facility] Certification date range:  12-28-23/3-27-24    Therapy Diagnoses:   1. Back pain  Follow Up In Physical Therapy      2. Lumbar spondylosis  Follow Up In Physical Therapy        General:  Reason for visit: LS pain   Referred by: Jyothi Crain MD appt:  no follow up scheduled  Preferred Name:  Marysol  Script:  PT  Onset Date:  6-28-21  Most recent assessment/re-assessment:  12-28-23  Email/phone #:  prefers printed copy of exercises  Access Code: [email or web address]     Assessment:  Patient tolerated treatment well, did well with progression this date.    Patient needs continued work on/skilled PT for: core stability and LE flexibility to address remaining functional, objective and subjective deficits to allow them to return to prior /optimal level of function with ADLs.  Patient is progressing with goals: compliance with HEP  Skilled care:  aquatic exercise instruction/introduction.     Plan:    Continue to progress per poc:   NV add Current as able.  Progress to deep end activities as tolerated as well.     Subjective:   Patient reports:  that she is feeling about the same.     Have you fallen since last visit:  no    Precautions:  no fall risk   lung disease/osteoporosis/breathing machine/high cholesterol, pacemaker, parathyroid surgery, memory loss, see meds in chart/NO E-STIM     Pain:  6-7/10  Location/Type of pain:  B LE    HEP compliance/understanding:  yes    Objective:   Objective Measurements:    Function:   wearing lumbar brace for daily activities and using heat for relief of symptoms.   Gait: negotiates stairs reciprocally into and out of pool with B UE support.     Treatment:  "  Aquatics:          38 Minutes  , Rocael, helps patient change before and after session)  TM= treadmill T= speed C= current  NV= next visit  np= not performed  nb= non-billable  G= group     Stair hams and hip flexor/calf stretches:  3/30\" ea.   TM for walking:  T=3 x 5' cues to heel strike  TM Retro walking:  T=2 X 3' cues to take big steps  Side stepping at side of pool:  x 5 laps  Marching with TM bar B UE support  x 15  3 Way leg kick with TM bar B UE support  x 15 ea.     BTW pelvic tilt:  x 10/5\"  BTW can cans:  x 10  BTW dynamic hamstring stretch:  x 10        Education:  Aquatic exercise introduction and education including theory of buoyancy and resistance and water safety and pool layout.       "

## 2024-01-15 ENCOUNTER — TREATMENT (OUTPATIENT)
Dept: PHYSICAL THERAPY | Facility: CLINIC | Age: 84
End: 2024-01-15
Payer: MEDICARE

## 2024-01-15 DIAGNOSIS — M54.9 BACK PAIN: ICD-10-CM

## 2024-01-15 DIAGNOSIS — M47.816 LUMBAR SPONDYLOSIS: ICD-10-CM

## 2024-01-15 PROCEDURE — 97113 AQUATIC THERAPY/EXERCISES: CPT | Mod: GP,CQ

## 2024-01-15 NOTE — PROGRESS NOTES
Physical Therapy  Physical Therapy Progress Note    Patient Name Marysol Hill   MRN: [id number]  Today's Date: 01/15/24  Time Calculation  Start Time: 1130  Stop Time: 1215  Time Calculation (min): 45 min    Insurance:    Visit #: 3  Insurance Reviewed  [facility] $1089 cap money used)  (per information provided by  pre-cert team)   [facility] Certification date range:  12-28-23/3-27-24    Therapy Diagnoses:   1. Back pain  Follow Up In Physical Therapy      2. Lumbar spondylosis  Follow Up In Physical Therapy        General:  Reason for visit: LS pain   Referred by: Jyothi Crain MD appt:  no follow up scheduled  Preferred Name:  Marysol  Script:  PT  Onset Date:  6-28-21  Most recent assessment/re-assessment:  12-28-23  Email/phone #:  prefers printed copy of exercises  Access Code: [email or web address]     Assessment:  Progressed patient with addition of current to LE exercises and ambulation. Able to maintain balance and trunk stability with cueing for core engagement and maintaining COG over SOBIA.  Able to move against direction of resistance with max cueing and frequent demonstration on deck by therapist.  Able to perform squats with adequate technique and posture against the current.  Positive response post session.   Patient needs continued work on/skilled PT for: core stability and LE flexibility to address remaining functional, objective and subjective deficits to allow them to return to prior /optimal level of function with ADLs.  Patient is progressing with goals: compliance with HEP  Skilled care:  aquatic exercise progression, patient education    Plan:    Continue to progress per poc:   Assess response to addition of current with amb and exercises.  Add deep water ex if scheduled appropriately within block next visit as patient has verbalized she cannot swim and prefer a  in the deep end if it has to occur.      Subjective:   Patient reports:  positive response to initial aquatic exercise session, but reports her  "pain is the same and always there.    Have you fallen since last visit:  no    Precautions:  no fall risk   lung disease/osteoporosis/breathing machine/high cholesterol, pacemaker, parathyroid surgery, memory loss, see meds in chart/NO E-STIM     Pain:  6-7/10  Location/Type of pain:  B LE    HEP compliance/understanding:  yes    Objective:   Objective Measurements:    Posture:  V/c required for elevated alanna [place] with UE support.    Gait: negotiates stairs reciprocally into and out of pool with B UE support.     Treatment:   Aquatics:          45 Minutes  , Rocael, helps patient change before and after session)  TM= treadmill T= speed C= current  NV= next visit  np= not performed  nb= non-billable  G= group     Stair hams and hip flexor/calf stretches:  3/30\" ea.   TM for walking:  T=3, C=8 x 5' cues to heel strike  TM Retro walking:  T=2, C=8 x 3' cues to take big steps  C=8 Side stepping at side of pool:  x 5 laps  C=8 Marching with TM bar B UE support  x 20 x 4 directions  C=8 3 Way leg kick with TM bar B UE support  x 15 ea, alanna, kicking against C each direction  C=8 @TM bars partial squats x 10 facing C, x 10 facing away from C    BTW pelvic tilt:  x 10/5\"  BTW can cans:  x 10 alanna  BTW dynamic hamstring stretch:  x 10 alanna    Education:  V/c and demonstration by therapist on deck for correct technique and posture with aquatic exercises.    "

## 2024-01-16 ENCOUNTER — APPOINTMENT (OUTPATIENT)
Dept: PAIN MEDICINE | Facility: CLINIC | Age: 84
End: 2024-01-16
Payer: MEDICARE

## 2024-01-18 ENCOUNTER — TREATMENT (OUTPATIENT)
Dept: PHYSICAL THERAPY | Facility: CLINIC | Age: 84
End: 2024-01-18
Payer: MEDICARE

## 2024-01-18 DIAGNOSIS — M54.9 BACK PAIN: ICD-10-CM

## 2024-01-18 DIAGNOSIS — M47.816 LUMBAR SPONDYLOSIS: ICD-10-CM

## 2024-01-18 PROCEDURE — 97113 AQUATIC THERAPY/EXERCISES: CPT | Mod: GP | Performed by: PHYSICAL THERAPIST

## 2024-01-18 NOTE — PROGRESS NOTES
"Physical Therapy  Physical Therapy Progress Note    Patient Name Marysol Hill   MRN: [id number]  Today's Date: 01/18/24  Time Calculation  Start Time: 0320  Stop Time: 0400  Time Calculation (min): 40 min    Insurance:    Visit #: 4  Insurance Reviewed  [facility] $1089 cap money used)  (per information provided by  pre-cert team)   [facility] Certification date range:  12-28-23/3-27-24    Therapy Diagnoses:   1. Back pain  Follow Up In Physical Therapy      2. Lumbar spondylosis  Follow Up In Physical Therapy        General:  Reason for visit: LS pain   Referred by: Jyothi Crain MD appt:  no follow up scheduled  Preferred Name:  Marysol  Script:  PT  Onset Date:  6-28-21  Most recent assessment/re-assessment:  12-28-23  Email/phone #:  prefers printed copy of exercises  Access Code: [email or web address]     Assessment:  Patient tolerated treatment well, did well with progression this date.    Patient needs continued work on/skilled PT for: core stability and strength to address remaining functional, objective and subjective deficits to allow them to return to prior /optimal level of function with ADLs.  Patient is progressing with goals: improving sleep and postural awareness.   Skilled care:  aquatic exercise progression    Plan:    Continue to progress per poc:   NV add deep end activities as able.     Subjective:   Patient reports:  that she felt more relaxed going to bed the night after the first aquatics session    Have you fallen since last visit:  no    Precautions:  no fall risk   lung disease/osteoporosis/breathing machine/high cholesterol, pacemaker, parathyroid surgery, memory loss, see meds in chart/NO E-STIM     Pain:  5/10  Location/Type of pain:  B LE    HEP compliance/understanding:  yes    Objective:   Objective Measurements:    Function:   improved sleep after aquatics session  Posture:  increased postural awareness.    Treatment:   Aquatics:          38 minutes  Stair hams and hip flexor/calf stretches:  3/30\" ea. "   C=10 TM for walking:  T=3 x 8' cues to heel strike  C= 10 TM Retro walking:  T=2 X 5' cues to take big steps  C=10 Side stepping at side of pool:  x 5 laps  4 way Marching in C=10  x 15 ea.   3 Way leg kick barbell/wall support for flex/ext and bar for abd facing wall only x 15 ea.     BTW can cans:  x 10  BTW dynamic hamstring stretch:  x 10    Education:  aquatic exercise progression

## 2024-01-19 ENCOUNTER — LAB (OUTPATIENT)
Dept: LAB | Facility: LAB | Age: 84
End: 2024-01-19
Payer: MEDICARE

## 2024-01-19 DIAGNOSIS — E78.5 HYPERLIPIDEMIA, UNSPECIFIED: Primary | ICD-10-CM

## 2024-01-19 LAB
CHOLEST SERPL-MCNC: 144 MG/DL (ref 0–199)
CHOLESTEROL/HDL RATIO: 2.3
HDLC SERPL-MCNC: 61.9 MG/DL
LDLC SERPL CALC-MCNC: 62 MG/DL
NON HDL CHOLESTEROL: 82 MG/DL (ref 0–149)
TRIGL SERPL-MCNC: 102 MG/DL (ref 0–149)
TSH SERPL-ACNC: 1.95 MIU/L (ref 0.44–3.98)
VLDL: 20 MG/DL (ref 0–40)

## 2024-01-19 PROCEDURE — 84443 ASSAY THYROID STIM HORMONE: CPT

## 2024-01-19 PROCEDURE — 80061 LIPID PANEL: CPT

## 2024-01-19 PROCEDURE — 36415 COLL VENOUS BLD VENIPUNCTURE: CPT

## 2024-01-24 ENCOUNTER — TREATMENT (OUTPATIENT)
Dept: PHYSICAL THERAPY | Facility: CLINIC | Age: 84
End: 2024-01-24
Payer: MEDICARE

## 2024-01-24 DIAGNOSIS — M54.9 BACK PAIN: ICD-10-CM

## 2024-01-24 DIAGNOSIS — M47.816 LUMBAR SPONDYLOSIS: ICD-10-CM

## 2024-01-24 PROCEDURE — 97113 AQUATIC THERAPY/EXERCISES: CPT | Mod: GP,CQ

## 2024-01-24 NOTE — PROGRESS NOTES
"Physical Therapy  Physical Therapy Progress Note    Patient Name Marysol Hill   MRN: [id number]  Today's Date: 01/24/24  Time Calculation  Start Time: 0120  Stop Time: 0200  Time Calculation (min): 40 min    Insurance:    Visit #: 4  Insurance Reviewed  [facility] $1089 cap money used)  (per information provided by  pre-cert team)   [facility] Certification date range:  12-28-23/3-27-24    Therapy Diagnoses:   1. Back pain  Follow Up In Physical Therapy      2. Lumbar spondylosis  Follow Up In Physical Therapy        General:  Reason for visit: LS pain   Referred by: Jyothi Crain MD appt:  no follow up scheduled  Preferred Name:  Marysol  Script:  PT  Onset Date:  6-28-21  Most recent assessment/re-assessment:  12-28-23  Email/phone #:  prefers printed copy of exercises  Access Code: [email or web address]     Assessment:  Patient tolerated treatment well, did well with progression this date.  Progressed patient with deep water exercises this date.  Patient require frequent verbal directional cues and demonstration of correct technique, posture, and arc of motion by therapist on deck throughout ex session.  Able to maintain balance with multidirectional marching against the current this visit while maintaining adequate space near the wall   Positive response post session.    Patient needs continued work on/skilled PT for: core stability and strength to address remaining functional, objective and subjective deficits to allow them to return to prior /optimal level of function with ADLs.  Patient is progressing with goals: improving sleep and postural awareness.   Skilled care:  aquatic exercise progression, patient education    Plan:    Continue to progress per poc:   Add xc skiing in deep end as able.     Subjective:   Patient reports:  that she enjoys coming to aquatic therapy and has more mobility and energy afterwards.  Denies LE pain this date and reports it is in her lumbar region, in a \"V\" shape with the point of the \"V\" going down " "towards her sacrum.      Have you fallen since last visit:  no    Precautions:  no fall risk   lung disease/osteoporosis/breathing machine/high cholesterol, pacemaker, parathyroid surgery, memory loss, see meds in chart/NO E-STIM     Pain:  5/10  Location/Type of pain:  alanna lumbar region    HEP compliance/understanding:  yes    Objective:   Objective Measurements:    Function:  improved mobility post aquatic ex sessions  Posture:  increased postural awareness.    Treatment:   Aquatics:          40 minutes  Stair hams and hip flexor/calf stretches:  3/30\" ea.   C=10 TM for walking:  T=3 x 8' cues to heel strike  C= 10 TM Retro walking:  T=2 X 5' cues to take big steps  C=10 Side stepping at side of pool:  x 5 laps  4 way Marching in C=10  x 20 ea.   C=10 3 Way leg kick barbell/wall support for flex/ext and bar for abd facing wall only x 15 ea.   C=10 partial squats x 10 facing C x 10 facing away from C @ TM bars  BTW can cans:  x 10  BTW dynamic hamstring stretch:  x 10    6 foot region: w/blue, round noodle  Bicycle fwd x 2'  Scissors x 20    Education:  aquatic exercise progression, patient education  "

## 2024-01-25 ENCOUNTER — HOSPITAL ENCOUNTER (OUTPATIENT)
Dept: RADIOLOGY | Facility: CLINIC | Age: 84
Discharge: HOME | End: 2024-01-25
Payer: MEDICARE

## 2024-01-25 VITALS — WEIGHT: 117 LBS | BODY MASS INDEX: 19.97 KG/M2 | HEIGHT: 64 IN

## 2024-01-25 DIAGNOSIS — Z12.31 ENCOUNTER FOR SCREENING MAMMOGRAM FOR MALIGNANT NEOPLASM OF BREAST: ICD-10-CM

## 2024-01-25 PROCEDURE — 77063 BREAST TOMOSYNTHESIS BI: CPT | Performed by: RADIOLOGY

## 2024-01-25 PROCEDURE — 77067 SCR MAMMO BI INCL CAD: CPT | Performed by: RADIOLOGY

## 2024-01-25 PROCEDURE — 77067 SCR MAMMO BI INCL CAD: CPT

## 2024-01-26 ENCOUNTER — APPOINTMENT (OUTPATIENT)
Dept: PHYSICAL THERAPY | Facility: CLINIC | Age: 84
End: 2024-01-26
Payer: MEDICARE

## 2024-01-29 ENCOUNTER — TREATMENT (OUTPATIENT)
Dept: PHYSICAL THERAPY | Facility: CLINIC | Age: 84
End: 2024-01-29
Payer: MEDICARE

## 2024-01-29 DIAGNOSIS — M47.816 LUMBAR SPONDYLOSIS: ICD-10-CM

## 2024-01-29 DIAGNOSIS — M54.9 BACK PAIN: ICD-10-CM

## 2024-01-29 PROCEDURE — 97113 AQUATIC THERAPY/EXERCISES: CPT | Mod: GP,CQ

## 2024-01-29 NOTE — PROGRESS NOTES
Physical Therapy  Physical Therapy Progress Note    Patient Name Marysol Hill   MRN: [id number]  Today's Date: 01/29/24  Time Calculation  Start Time: 0230  Stop Time: 0315  Time Calculation (min): 45 min    Insurance:    Visit #: 5  Insurance Reviewed  [facility] $1089 cap money used)  (per information provided by  pre-cert team)   [facility] Certification date range:  12-28-23/3-27-24    Therapy Diagnoses:   1. Back pain  Follow Up In Physical Therapy      2. Lumbar spondylosis  Follow Up In Physical Therapy        General:  Reason for visit: LS pain   Referred by: Jyothi Crain MD appt:  no follow up scheduled  Preferred Name:  Marysol  Script:  PT  Onset Date:  6-28-21  Most recent assessment/re-assessment:  12-28-23  Email/phone #:  prefers printed copy of exercises  Access Code: [email or web address]     Assessment:  Progressed patient with deep water xc skiing.  Patient requires verbal instructions and demonstration for correct technique with new and existing exercises to ensure proper arc of motion, and avoiding compensatory mechanisms.  Denied increased pain with progression of ex.  Positive response post session.   Patient needs continued work on/skilled PT for: core stability and strength to address remaining functional, objective and subjective deficits to allow them to return to prior /optimal level of function with ADLs.  Patient is progressing with goals: improving sleep and postural awareness.   Skilled care:  aquatic exercise progression, patient education    Plan:    Continue to progress per poc:   Add DLS UE abd/add against wall NV.       Subjective:   Patient reports an overall reduction and pain. States although she never let her pain stop her from performing her activities, she feels stronger doing them after a few weeks of aquatic PT.     Have you fallen since last visit:  no    Precautions:  no fall risk   lung disease/osteoporosis/breathing machine/high cholesterol, pacemaker, parathyroid surgery, memory loss,  "see meds in chart/NO E-STIM     Pain:  4/10  Location/Type of pain:  R. Glute/deep ache    HEP compliance/understanding:  yes    Objective:   Objective Measurements:    Posture:  Defaulted to guarded posture this date with frequent cues required for alanna UT elevation with UE support.    Treatment:   Aquatics:          45 minutes  Stair hams and hip flexor/calf stretches:  3/30\" ea.   C=10 TM for walking:  T=3 x 5' cues to heel strike  C= 10 TM Retro walking:  T=2 X 5' cues to take big steps  C=10 Side stepping at side of pool:  x 5 laps  4 way Marching in C=10  x 20 ea.   C=10 3 Way leg kick barbell/wall support for flex/ext and bar for abd facing wall only x 15 ea.   C=10 partial squats x 10 facing C x 10 facing away from C @ TM bars  BTW can cans:  x 10/NV  BTW dynamic hamstring stretch:  x 10 alanna    6 foot region: w/blue, round noodle  Bicycle fwd x 2'  Scissors x 20  Xc ski x 20    Education:  V/c and demonstration by therapist on deck for correct technique and posture with aquatic exercises.   "

## 2024-01-30 ENCOUNTER — OFFICE VISIT (OUTPATIENT)
Dept: PAIN MEDICINE | Facility: CLINIC | Age: 84
End: 2024-01-30
Payer: MEDICARE

## 2024-01-30 VITALS
RESPIRATION RATE: 15 BRPM | DIASTOLIC BLOOD PRESSURE: 58 MMHG | HEART RATE: 61 BPM | SYSTOLIC BLOOD PRESSURE: 107 MMHG | TEMPERATURE: 96.6 F | WEIGHT: 117 LBS | BODY MASS INDEX: 20.08 KG/M2

## 2024-01-30 DIAGNOSIS — M47.816 LUMBAR SPONDYLOSIS: ICD-10-CM

## 2024-01-30 DIAGNOSIS — M54.50 CHRONIC LOW BACK PAIN, UNSPECIFIED BACK PAIN LATERALITY, UNSPECIFIED WHETHER SCIATICA PRESENT: Primary | ICD-10-CM

## 2024-01-30 DIAGNOSIS — M51.36 DEGENERATIVE LUMBAR DISC: ICD-10-CM

## 2024-01-30 DIAGNOSIS — M54.16 LUMBAR NEURITIS: ICD-10-CM

## 2024-01-30 DIAGNOSIS — G89.29 CHRONIC LOW BACK PAIN, UNSPECIFIED BACK PAIN LATERALITY, UNSPECIFIED WHETHER SCIATICA PRESENT: Primary | ICD-10-CM

## 2024-01-30 PROCEDURE — 99214 OFFICE O/P EST MOD 30 MIN: CPT | Performed by: ANESTHESIOLOGY

## 2024-01-30 ASSESSMENT — PAIN SCALES - GENERAL: PAINLEVEL: 6

## 2024-01-30 NOTE — PROGRESS NOTES
History Of Present Illness  Marysol Hill is a 83 y.o. female presenting with   Chief Complaint   Patient presents with    Back Pain     Patient with diment returns with her  regarding her  chronic low back pain across her back worse on the right side. The pain is constant, worse with getting out of chair and better with sitting. The pain is a constant aching like sensation. Denies LE paresthesias, weakness, saddle anesthesia, bowel or bladder incontinence. To manage this pain the patient has attempted IBUPROFEN AND PT WITH NO RELIEF. The patients chronic GERD, Asthma are stable    PSHx  -Appendectomy  -Parathyroid surgery  -Hysterectomy    SocHx  -Quit tob 60 years ago  -Occ EtOH  -Worked for chief of surgery at [employer]    PAIN SCORE: 6 / 10    Neuro: Dr. Medrano  Surgeon: Dr. Fairchild          Past Medical History  She has a past medical history of Personal history of other diseases of the circulatory system, Personal history of other diseases of the circulatory system (08/31/2019), and Personal history of other endocrine, nutritional and metabolic disease.    Surgical History  She has a past surgical history that includes Appendectomy (01/13/2016); Hysterectomy (01/13/2016); Other surgical history (01/13/2016); and Other surgical history (10/28/2020).     Social History  She reports that she has quit smoking. Her smoking use included cigarettes. She has never used smokeless tobacco. She reports current alcohol use. She reports that she does not use drugs.    Family History  Family History   Problem Relation Name Age of Onset    Alzheimer's disease Mother      Dementia Mother      Other (CVA) Father          Allergies  Mushroom    Review of Systems    All other systems reviewed and negative for any deficits. Pertinent positives and negatives were considered in the medical decision making process.        Physical Exam  /58   Pulse 61   Temp 35.9 °C (96.6 °F)   Resp 15   Wt 53.1 kg (117  lb)     General: Pt appears stated age    Eyes: Conjunctiva non-icteric and lids without obvious rash or drooping. Pupils are symmetric    ENT: External ears are without deformity or rash. Hearing is grossly intact    Neck: No JVD noted, tracheal position midline.     Respiratory: No gasping or shortness of breath noted, no use of accessory muscles noted    Cardiovascular: Extremities show no edema or varicosities    Skin: No rashes or open lesions/ulcers identified on skin.     Musculoskeletal: Gait is grossly normal    Digits/nails show no clubbing or cyanosis    Exam of muscles/joints/bones shows no gross atrophy and no abnormal/involuntary movements in the head/neckNo asymmetry or masses noted in the head/neck    Stability: no subluxation noted on movement of bilateral upper extremities or head/neck    Strength: 4/5 in B/L lower extremities     Range of Motion: dec spinal flexion and extension due to pain.     Sensation: In tact     Cranial nerves 2-12 are grossly intact    Psychiatric: Pt is alert and oriented to time, place and person.         Assessment/Plan   1. Chronic low back pain, unspecified back pain laterality, unspecified whether sciatica present        2. Lumbar spondylosis        3. Lumbar neuritis        4. Degenerative lumbar disc            Lumbar spondylosis (721.3) (M47.816)   · Chronic low back pain (724.2,338.29) (M54.50,G89.29)    Provider Impressions    1. I have provided the patient with a list of physical therapy exercises to learn and perform to strengthen core, maintain stabilization, and reduce pain. We reviewed the exercises in detail and I encouraged them to perform them on a regular basis.    - The pt is scheduled to start water therapy.     2. I would recommend the pt start on Gabapentin to help with nerve related pain. We discussed the risks, benefits, and side effects to this medication including the mechanism of action and the pt understands and agrees.    3. I extensively  reviewed the patients MRI findings (8-3-2023 [facility]) in detail, including review of the actual images and provided a detailed explanation of the findings using a spine model.     There is lumbar spondylosis and disc degeneration at multiple levels.     4. The patient is a candidate for an LESI at L2/3 versus  LUMBAR MEDIAL BRANCH L4/5 and L5/S1 bilaterally under fluoroscopic guidance to treat back pain. I spent time with the patient discussing all of the risks, benefits, and alternatives to this measure. Including but not limited to spinal infection, epidural hematoma/abscess, paralysis, nerve injury, steroid effects, and spinal headache. The patient understands and agrees to proceed.    Her last injection at this level was on 12- and 10-6-2023 and she reported 80% relief of her pain for 1 months time. She was able to sit and stand with much less pain.     I spent time with the patient reviewing their imaging and discussing the risks benefits and alternatives to the above plan. A total of 30 minutes was spent reviewing the data and greater than 50% of that time was with the patient during the face to face encounter discussing treatment options both surgical, non-surgical, and minimally invasive techniques.              Mike Matthews MD

## 2024-01-30 NOTE — H&P (VIEW-ONLY)
History Of Present Illness  Marysol Hill is a 83 y.o. female presenting with   Chief Complaint   Patient presents with    Back Pain     Patient with diment returns with her  regarding her  chronic low back pain across her back worse on the right side. The pain is constant, worse with getting out of chair and better with sitting. The pain is a constant aching like sensation. Denies LE paresthesias, weakness, saddle anesthesia, bowel or bladder incontinence. To manage this pain the patient has attempted IBUPROFEN AND PT WITH NO RELIEF. 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 Medrano  Surgeon: Dr. Fairchild          Past Medical History  She has a past medical history of Personal history of other diseases of the circulatory system, Personal history of other diseases of the circulatory system (08/31/2019), and Personal history of other endocrine, nutritional and metabolic disease.    Surgical History  She has a past surgical history that includes Appendectomy (01/13/2016); Hysterectomy (01/13/2016); Other surgical history (01/13/2016); and Other surgical history (10/28/2020).     Social History  She reports that she has quit smoking. Her smoking use included cigarettes. She has never used smokeless tobacco. She reports current alcohol use. She reports that she does not use drugs.    Family History  Family History   Problem Relation Name Age of Onset    Alzheimer's disease Mother      Dementia Mother      Other (CVA) Father          Allergies  Mushroom    Review of Systems    All other systems reviewed and negative for any deficits. Pertinent positives and negatives were considered in the medical decision making process.        Physical Exam  /58   Pulse 61   Temp 35.9 °C (96.6 °F)   Resp 15   Wt 53.1 kg (117  lb)     General: Pt appears stated age    Eyes: Conjunctiva non-icteric and lids without obvious rash or drooping. Pupils are symmetric    ENT: External ears are without deformity or rash. Hearing is grossly intact    Neck: No JVD noted, tracheal position midline.     Respiratory: No gasping or shortness of breath noted, no use of accessory muscles noted    Cardiovascular: Extremities show no edema or varicosities    Skin: No rashes or open lesions/ulcers identified on skin.     Musculoskeletal: Gait is grossly normal    Digits/nails show no clubbing or cyanosis    Exam of muscles/joints/bones shows no gross atrophy and no abnormal/involuntary movements in the head/neckNo asymmetry or masses noted in the head/neck    Stability: no subluxation noted on movement of bilateral upper extremities or head/neck    Strength: 4/5 in B/L lower extremities     Range of Motion: dec spinal flexion and extension due to pain.     Sensation: In tact     Cranial nerves 2-12 are grossly intact    Psychiatric: Pt is alert and oriented to time, place and person.         Assessment/Plan   1. Chronic low back pain, unspecified back pain laterality, unspecified whether sciatica present        2. Lumbar spondylosis        3. Lumbar neuritis        4. Degenerative lumbar disc            Lumbar spondylosis (721.3) (M47.816)   · Chronic low back pain (724.2,338.29) (M54.50,G89.29)    Provider Impressions    1. I have provided the patient with a list of physical therapy exercises to learn and perform to strengthen core, maintain stabilization, and reduce pain. We reviewed the exercises in detail and I encouraged them to perform them on a regular basis.    - The pt is scheduled to start water therapy.     2. I would recommend the pt start on Gabapentin to help with nerve related pain. We discussed the risks, benefits, and side effects to this medication including the mechanism of action and the pt understands and agrees.    3. I extensively  reviewed the patients MRI findings (8-3-2023 [facility]) in detail, including review of the actual images and provided a detailed explanation of the findings using a spine model.     There is lumbar spondylosis and disc degeneration at multiple levels.     4. The patient is a candidate for an LESI at L2/3 versus  LUMBAR MEDIAL BRANCH L4/5 and L5/S1 bilaterally under fluoroscopic guidance to treat back pain. I spent time with the patient discussing all of the risks, benefits, and alternatives to this measure. Including but not limited to spinal infection, epidural hematoma/abscess, paralysis, nerve injury, steroid effects, and spinal headache. The patient understands and agrees to proceed.    Her last injection at this level was on 12- and 10-6-2023 and she reported 80% relief of her pain for 1 months time. She was able to sit and stand with much less pain.     I spent time with the patient reviewing their imaging and discussing the risks benefits and alternatives to the above plan. A total of 30 minutes was spent reviewing the data and greater than 50% of that time was with the patient during the face to face encounter discussing treatment options both surgical, non-surgical, and minimally invasive techniques.              Mike Matthews MD

## 2024-02-01 ENCOUNTER — APPOINTMENT (OUTPATIENT)
Dept: PHYSICAL THERAPY | Facility: CLINIC | Age: 84
End: 2024-02-01
Payer: MEDICARE

## 2024-02-06 ENCOUNTER — TREATMENT (OUTPATIENT)
Dept: PHYSICAL THERAPY | Facility: CLINIC | Age: 84
End: 2024-02-06
Payer: MEDICARE

## 2024-02-06 DIAGNOSIS — M47.816 LUMBAR SPONDYLOSIS: ICD-10-CM

## 2024-02-06 DIAGNOSIS — M54.9 BACK PAIN: ICD-10-CM

## 2024-02-06 PROCEDURE — 97113 AQUATIC THERAPY/EXERCISES: CPT | Mod: GP | Performed by: PHYSICAL THERAPIST

## 2024-02-06 NOTE — PROGRESS NOTES
Physical Therapy  Physical Therapy Progress Note    Patient Name Marysol Hill   MRN: [id number]  Today's Date: 02/06/24  Time Calculation  Start Time: 1148  Stop Time: 1245  Time Calculation (min): 57 min    Insurance:    Visit #: 7  Insurance Reviewed  [facility] $1089 cap money used)  (per information provided by  pre-cert team)   [facility] Certification date range:  12-28-23/3-27-24    Therapy Diagnoses:   1. Back pain  Follow Up In Physical Therapy      2. Lumbar spondylosis  Follow Up In Physical Therapy        General:  Reason for visit: LS pain   Referred by: Jyothi Crain MD appt:  no follow up scheduled  Preferred Name:  Marysol  Script:  PT  Onset Date:  6-28-21  Most recent assessment/re-assessment:  12-28-23  Email/phone #:  prefers printed copy of exercises  Access Code: [email or web address]    Assessment:  Patient tolerated treatment well, did well with progression this date.    Patient needs continued work on/skilled PT for: core stability and strength to address remaining functional, objective and subjective deficits to allow them to return to prior /optimal level of function with ADLs.  Patient is progressing with goals: increased postural awareness and slightly decreased pain  Skilled care:  aquatic exercise progression    Plan:    Continue to progress per poc:   NV add paddles for UE dynamic stabilization activities    Subjective:   Patient reports:  that she saw Dr. Matthews yesterday and he gave her HEP and wants her to continue with aquatic exercise.   Patient feels that she continues to have pain and she is used to it, but it may be slightly better.     Have you fallen since last visit:  no    Precautions:   no fall risk   lung disease/osteoporosis/breathing machine/high cholesterol, pacemaker, parathyroid surgery, memory loss, see meds in chart/NO E-STIM     Pain:  3-4/10  Location/Type of pain:  R. Glute/deep ache     HEP compliance/understanding:  yes     Objective:   Objective Measurements:    Posture:  "increased postural and core awareness.     Treatment:     Aquatics:          53 minutes  Stair hams and hip flexor/calf stretches:  3/30\" ea.   C=10 TM for walking:  T=3 x 5' good pattern  C= 10 TM Retro walking:  T=2 X 5' cues to take big steps  C=10 Side stepping at side of pool:  x 5 laps  4 way Marching in C=10  x 20 ea.   C=10 3 Way leg kick standing on TM, but not holding on for flexion B UE support for abd ea. Way and ext x 10 ea.   C=10 partial squats x 20 facing C x 20 facing away from C @ TM bars    BTW can cans:  x 10/NV  BTW dynamic hamstring stretch:  x 10 alanna  BTW B/alt UE flex/ext/ B abd/add:  x 10 ea. (Paddles NV)    6 foot region: w/blue, round noodle  Bicycle fwd x 2'  Scissors x 20  Xc ski x 20  Hang:  x 2'    Education:  aquatic exercise progression    "

## 2024-02-08 ENCOUNTER — APPOINTMENT (OUTPATIENT)
Dept: PHYSICAL THERAPY | Facility: CLINIC | Age: 84
End: 2024-02-08
Payer: MEDICARE

## 2024-02-08 ENCOUNTER — HOSPITAL ENCOUNTER (OUTPATIENT)
Dept: RADIOLOGY | Facility: CLINIC | Age: 84
Discharge: HOME | End: 2024-02-08
Payer: MEDICARE

## 2024-02-08 ENCOUNTER — HOSPITAL ENCOUNTER (OUTPATIENT)
Dept: PAIN MEDICINE | Facility: CLINIC | Age: 84
Discharge: HOME | End: 2024-02-08
Payer: MEDICARE

## 2024-02-08 VITALS
TEMPERATURE: 98.1 F | WEIGHT: 117 LBS | OXYGEN SATURATION: 99 % | RESPIRATION RATE: 15 BRPM | BODY MASS INDEX: 20.08 KG/M2 | DIASTOLIC BLOOD PRESSURE: 56 MMHG | HEART RATE: 60 BPM | SYSTOLIC BLOOD PRESSURE: 123 MMHG

## 2024-02-08 DIAGNOSIS — M54.16 LUMBAR NEURITIS: ICD-10-CM

## 2024-02-08 PROCEDURE — A4216 STERILE WATER/SALINE, 10 ML: HCPCS

## 2024-02-08 PROCEDURE — 2500000005 HC RX 250 GENERAL PHARMACY W/O HCPCS

## 2024-02-08 PROCEDURE — 62323 NJX INTERLAMINAR LMBR/SAC: CPT

## 2024-02-08 PROCEDURE — 77003 FLUOROGUIDE FOR SPINE INJECT: CPT

## 2024-02-08 PROCEDURE — 2500000004 HC RX 250 GENERAL PHARMACY W/ HCPCS (ALT 636 FOR OP/ED)

## 2024-02-08 RX ORDER — DEXAMETHASONE SODIUM PHOSPHATE 10 MG/ML
INJECTION INTRAMUSCULAR; INTRAVENOUS
Status: COMPLETED
Start: 2024-02-08 | End: 2024-02-08

## 2024-02-08 RX ORDER — SODIUM CHLORIDE 9 MG/ML
INJECTION, SOLUTION INTRAMUSCULAR; INTRAVENOUS; SUBCUTANEOUS
Status: COMPLETED
Start: 2024-02-08 | End: 2024-02-08

## 2024-02-08 RX ORDER — LIDOCAINE HYDROCHLORIDE 5 MG/ML
INJECTION, SOLUTION INFILTRATION; INTRAVENOUS
Status: COMPLETED
Start: 2024-02-08 | End: 2024-02-08

## 2024-02-08 RX ORDER — TRIAMCINOLONE ACETONIDE 40 MG/ML
INJECTION, SUSPENSION INTRA-ARTICULAR; INTRAMUSCULAR
Status: COMPLETED
Start: 2024-02-08 | End: 2024-02-08

## 2024-02-08 RX ADMIN — SODIUM CHLORIDE 10 ML: 9 INJECTION, SOLUTION INTRAMUSCULAR; INTRAVENOUS; SUBCUTANEOUS at 13:13

## 2024-02-08 RX ADMIN — DEXAMETHASONE SODIUM PHOSPHATE 10 MG: 10 INJECTION, SOLUTION INTRAMUSCULAR; INTRAVENOUS at 13:13

## 2024-02-08 RX ADMIN — LIDOCAINE HYDROCHLORIDE 250 MG: 5 INJECTION, SOLUTION INFILTRATION at 13:13

## 2024-02-08 RX ADMIN — TRIAMCINOLONE ACETONIDE 40 MG: 40 INJECTION, SUSPENSION INTRA-ARTICULAR; INTRAMUSCULAR at 13:25

## 2024-02-08 ASSESSMENT — PATIENT HEALTH QUESTIONNAIRE - PHQ9
2. FEELING DOWN, DEPRESSED OR HOPELESS: NOT AT ALL
SUM OF ALL RESPONSES TO PHQ9 QUESTIONS 1 AND 2: 0
1. LITTLE INTEREST OR PLEASURE IN DOING THINGS: NOT AT ALL

## 2024-02-08 ASSESSMENT — PAIN DESCRIPTION - DESCRIPTORS: DESCRIPTORS: ACHING;SHARP

## 2024-02-08 ASSESSMENT — ENCOUNTER SYMPTOMS
LOSS OF SENSATION IN FEET: 0
DEPRESSION: 0
OCCASIONAL FEELINGS OF UNSTEADINESS: 0

## 2024-02-08 ASSESSMENT — PAIN - FUNCTIONAL ASSESSMENT: PAIN_FUNCTIONAL_ASSESSMENT: 0-10

## 2024-02-08 ASSESSMENT — PAIN SCALES - GENERAL
PAINLEVEL_OUTOF10: 3
PAINLEVEL_OUTOF10: 5 - MODERATE PAIN

## 2024-02-08 NOTE — OP NOTE
2/8/2024    Pre procedure Diagnosis: Lumbar neuritis  Post procedure Diagnosis: Lumbar neuritis    Procedure:     1. L4/5 interlaminar epidural steroid injection   2. Fluoroscopic guidance     Complications: None    Assistants: None     EBL: None    PROCEDURE: The patient was identified in the preoperative area. After risks and benefits were explained, informed consent was obtained. The patient was brought back to the operating room and placed in the prone position on the operating table. Standard ASA monitors were applied and monitored throughout the procedure. Their vital signs remained stable throughout the procedure. The patient's lumbosacral spine was prepped and draped in usual sterile fashion. Using fluoroscopic guidance the skin overlying the trajectory to the L4/5 space was anesthetized with a total of 5 ml of 0.5% Lidocaine. Thereafter, a 3.5 in long 20G [employer] needle was advanced through the anesthitized skin. Then, the needle was advanced into the L4/5 ligamentum flavum under multiplanar fluoroscopic guidance.  Then using a loss of resistance syringe and technique the epidural space was accessed. Next a total of 2 ml of OMNIPAQUE 240 radiocontrast dye was injected and it showed appropriate epidural spread and no vascular uptake. After negative aspiration for heme, or CSF a total of 4mL of Preservative Free Normal Saline and 40 mg of KENALOG was injected. The needle was removed and a sterile dressing was applied. The patient tolerated the procedure well and was transported to PACU in good condition.    PLAN: The pt will follow up in the office in one to two months to report their results with the procedure. Discharge instructions were reviewed in recovery and provided to the patient in writing. They were advised to call should they have any questions or concerns.

## 2024-02-13 ENCOUNTER — TREATMENT (OUTPATIENT)
Dept: PHYSICAL THERAPY | Facility: CLINIC | Age: 84
End: 2024-02-13
Payer: MEDICARE

## 2024-02-13 DIAGNOSIS — M54.9 BACK PAIN: Primary | ICD-10-CM

## 2024-02-13 DIAGNOSIS — M47.816 LUMBAR SPONDYLOSIS: ICD-10-CM

## 2024-02-13 PROCEDURE — 97110 THERAPEUTIC EXERCISES: CPT | Mod: GP | Performed by: PHYSICAL THERAPIST

## 2024-02-13 NOTE — PROGRESS NOTES
Physical Therapy  Physical Therapy Progress Note/re-assessment    Patient Name Marysol Hill   MRN: [id number]  Today's Date: 24  Time Calculation  Start Time: 1200  Stop Time: 1238  Time Calculation (min): 38 min    Insurance:    Visit #: 8  Insurance Reviewed  MCR $1089 cap money used)  (per information provided by  pre-cert team)   [facility] Certification date range:  23/3-27-24    Therapy Diagnoses:   1. Back pain  Follow Up In Physical Therapy      2. Lumbar spondylosis  Follow Up In Physical Therapy        General:  Reason for visit: LS pain   Referred by: Jyothi Crain MD appt:  no follow up scheduled  Preferred Name:  Marysol  Script:  PT  Onset Date:  21  Most recent assessment/re-assessment:  23  Email/phone #:  prefers printed copy of exercises  Access Code: [email or web address]    Assessment:  Skilled care:  re-assessment  STG:    -Increased postural awareness.  Met  -Compliant with HEP.  Met  LTG:  by discharge  -Increased postural awareness and posture WFL. Met  - pain to:  2/10 and patient I with self management of symptoms.  Partially Met  -Decreased pain and increased function with ADL's and IADL's.  Improve Oswestry  to: 10%  limitation of function. Partially Met  -Normal gait on level and uneven surfaces community level distances for improved function in the community. Met  -Increase trunk AROM to WFL for improved function with chores.   Met  -Increase trunk strength and stability and R/L LE strength to WFL for improved function with chores. Met  -Increase B LE flexibility to WFL.  Met  -Decrease B /lower quarter tenderness 50-75% per patient report. Met  -I and compliant with HEP and proper: LE/lower back care/I with proper aquatics program.  Progressing    Plan:    Continue to progress per poc:   One more visit to review aquatics program with , issued handout of aquatic exercises including stair hams and hip flexor calf stretches that  will bring to next aquatics  "session to review and progress and issue additional pictures prn at that visit.  Then, discharge to HEP and community aquatics program with , classed and I aquatic exercise with patient in the pool with her.      Subjective:   Patient reports:  that she is feeling better overall.  Plans to go to [employer] with her .  They have gone to the [employer] in the past and paperwork is on file.      Have you fallen since last visit:  no    Precautions:  no fall risk   lung disease/osteoporosis/breathing machine/high cholesterol, pacemaker, parathyroid surgery, memory loss, see meds in chart/NO E-STIM     Pain:  5/10  Location/Type of pain:  R LS ache    HEP compliance/understanding:  yes    Objective:   Objective Measurements:    Sleep:  WFL, supine with heating pad, instructed in proper postures. No change  ADL's:  WFL.    Chores:   takes breaks when ironing.  No change  Driving:  per patient's , she does not drive, some pain with transfers.  Unsure if it is different.    Work:  retired  Recreation:  women's clubs without difficulty, member of [employer], walks in [place] in the summer.    Sittin hours  Standing:   15-20(was 10')  Walkin-25'     Objective:    Outcome Measures:  Eval:  Other Measures  Oswestry Disablity Index (MENDY): 20% (10/50:  20% limitation of function.)   24:  Other Measures  Oswestry Disablity Index (MENDY): 20% (10/50:  20% limitation of function)       Posture:  flat LS and pelvic landmarks symmetrical.    24:  Increased awareness and wears back brace prn.       Gait/Stairs: moderately decreased trunk rotation and hip extension.  Reciprocal pattern on stairs.    24:       Palpation:  moderate tenderness B lumbar paraspinals and piriformis.    24:  - pop     ROM:  Trunk Flexion:  WNL (was 2\" 3rd to floor)  Extension: 0(was 0/20)  RSB:  WNL (was 2\" 3rd to fibular head)  LSB:  WNL (was 2\" 3rd to fibular head)  RR: " 75%(was 50%)  LR: 75%(was 50%)     MMT:  Abd:  3+/5(was 3-/5))  Bridge: 75%(was 25%)  B LE myotomes:  WFL and symmetrical     Flexibility:  Hamstrings:  90/90:  R:  -10(was -11)  L: -15(was -18)  Heelcords: 0 B  Hip flexors: min(was min/mod) B  Gluts: trace (was min) B  Piriformis: trace (was min)B    Treatment:   **= HEP  NV= Next visit  np= not performed  nb= non-billable  G= group HEP= discharged to HEP  Therapeutic Exercise:     38 minutes  Re-assessment    Education:  poc  HEP Progression:  issued aquatic exercise program handouts.

## 2024-02-22 ENCOUNTER — DOCUMENTATION (OUTPATIENT)
Dept: PHYSICAL THERAPY | Facility: CLINIC | Age: 84
End: 2024-02-22
Payer: MEDICARE

## 2024-02-22 NOTE — PROGRESS NOTES
Physical Therapy                 Therapy Communication Note    Patient Name: Marysol Hill  MRN: [id number]  Today's Date: 2/22/2024     Discipline: Physical Therapy    Missed Visit Reason:      Missed Time: No Show    Comment: Therapist spoke with patient who said her  would call back to reschedule last aquatic therapy appointment.  Patient wrote down phone number.

## 2024-02-27 ENCOUNTER — DOCUMENTATION (OUTPATIENT)
Dept: PHYSICAL THERAPY | Facility: CLINIC | Age: 84
End: 2024-02-27
Payer: MEDICARE

## 2024-02-27 NOTE — PROGRESS NOTES
Physical Therapy    Discharge Summary    Name: Marysol Hill  MRN: [id number]  : 1940  Date: 24    Discharge Summary: PT    Discharge Information: Date of last visit 24    Therapy Summary: patient was to attend one more PT session for instruction for her  for aquatics exercises and he states that they are going to a community aquatics class and do not need to return    Discharge Status: see 24 re-assessment     Rehab Discharge Reason: Achieved all and/or the most significant goals(s)

## 2024-04-23 ENCOUNTER — HOSPITAL ENCOUNTER (OUTPATIENT)
Dept: CARDIOLOGY | Facility: CLINIC | Age: 84
Discharge: HOME | End: 2024-04-23
Payer: MEDICARE

## 2024-04-23 DIAGNOSIS — Z95.0 PACEMAKER: ICD-10-CM

## 2024-04-23 DIAGNOSIS — I49.5 SICK SINUS SYNDROME (MULTI): ICD-10-CM

## 2024-04-23 PROCEDURE — 93296 REM INTERROG EVL PM/IDS: CPT

## 2024-04-23 PROCEDURE — 93294 REM INTERROG EVL PM/LDLS PM: CPT | Performed by: INTERNAL MEDICINE

## 2024-05-08 ENCOUNTER — OFFICE VISIT (OUTPATIENT)
Dept: PAIN MEDICINE | Facility: CLINIC | Age: 84
End: 2024-05-08
Payer: MEDICARE

## 2024-05-08 VITALS
WEIGHT: 117 LBS | RESPIRATION RATE: 15 BRPM | DIASTOLIC BLOOD PRESSURE: 59 MMHG | TEMPERATURE: 98.6 F | HEART RATE: 60 BPM | SYSTOLIC BLOOD PRESSURE: 120 MMHG | OXYGEN SATURATION: 97 % | BODY MASS INDEX: 20.08 KG/M2

## 2024-05-08 DIAGNOSIS — M47.816 LUMBAR SPONDYLOSIS: ICD-10-CM

## 2024-05-08 DIAGNOSIS — G89.29 CHRONIC LOW BACK PAIN, UNSPECIFIED BACK PAIN LATERALITY, UNSPECIFIED WHETHER SCIATICA PRESENT: ICD-10-CM

## 2024-05-08 DIAGNOSIS — M54.16 LUMBAR NEURITIS: Primary | ICD-10-CM

## 2024-05-08 DIAGNOSIS — M54.50 CHRONIC LOW BACK PAIN, UNSPECIFIED BACK PAIN LATERALITY, UNSPECIFIED WHETHER SCIATICA PRESENT: ICD-10-CM

## 2024-05-08 PROCEDURE — 99214 OFFICE O/P EST MOD 30 MIN: CPT | Performed by: ANESTHESIOLOGY

## 2024-05-08 ASSESSMENT — PAIN SCALES - GENERAL: PAINLEVEL: 6

## 2024-05-08 ASSESSMENT — ENCOUNTER SYMPTOMS
OCCASIONAL FEELINGS OF UNSTEADINESS: 0
LOSS OF SENSATION IN FEET: 0

## 2024-05-08 NOTE — H&P (VIEW-ONLY)
History Of Present Illness  Marysol Hill is a 84 y.o. female presenting with   Chief Complaint   Patient presents with    Follow-up     Patient with dementia returns with her  regarding ONGOING  chronic low back pain across her back worse on the right side. The pain is constant, worse with getting out of chair and better with sitting. The pain is a constant aching like sensation. Denies LE paresthesias, weakness, saddle anesthesia, bowel or bladder incontinence. To manage this pain the patient has attempted IBUPROFEN AND PT WITH NO RELIEF. The patients chronic GERD, Asthma are stable    PSHx  -Appendectomy  -Parathyroid surgery  -Hysterectomy    SocHx  -Quit tob 60 years ago  -Occ EtOH  -Worked for chief of surgery at [employer]    PAIN SCORE: 6 / 10    Neuro: Dr. Medrano  Surgeon: Dr. Fairchild          Past Medical History  She has a past medical history of Personal history of other diseases of the circulatory system, Personal history of other diseases of the circulatory system (08/31/2019), and Personal history of other endocrine, nutritional and metabolic disease.    Surgical History  She has a past surgical history that includes Appendectomy (01/13/2016); Hysterectomy (01/13/2016); Other surgical history (01/13/2016); and Other surgical history (10/28/2020).     Social History  She reports that she has quit smoking. Her smoking use included cigarettes. She has never used smokeless tobacco. She reports current alcohol use. She reports that she does not use drugs.    Family History  Family History   Problem Relation Name Age of Onset    Alzheimer's disease Mother      Dementia Mother      Other (CVA) Father          Allergies  Mushroom    Review of Systems    All other systems reviewed and negative for any deficits. Pertinent positives and negatives were considered in the medical decision making process.        Physical Exam  Resp 15   Wt 53.1 kg (117 lb)   SpO2 97%     General: Pt appears stated  age    Eyes: Conjunctiva non-icteric and lids without obvious rash or drooping. Pupils are symmetric    ENT: External ears are without deformity or rash. Hearing is grossly intact    Neck: No JVD noted, tracheal position midline.     Respiratory: No gasping or shortness of breath noted, no use of accessory muscles noted    Cardiovascular: Extremities show no edema or varicosities    Skin: No rashes or open lesions/ulcers identified on skin.     Musculoskeletal: Gait is grossly normal    Digits/nails show no clubbing or cyanosis    Exam of muscles/joints/bones shows no gross atrophy and no abnormal/involuntary movements in the head/neckNo asymmetry or masses noted in the head/neck    Stability: no subluxation noted on movement of bilateral upper extremities or head/neck    Strength: 4/5 in B/L lower extremities     Range of Motion: dec spinal flexion and extension due to pain.     Sensation: In tact     Cranial nerves 2-12 are grossly intact    Psychiatric: Pt is alert and oriented to time, place and person.         Assessment/Plan   No diagnosis found.      Lumbar spondylosis (721.3) (M47.816)   · Chronic low back pain (724.2,338.29) (M54.50,G89.29)    Provider Impressions    1. I have provided the patient with a list of physical therapy exercises to learn and perform to strengthen core, maintain stabilization, and reduce pain. We reviewed the exercises in detail and I encouraged them to perform them on a regular basis.    - The pt is scheduled to start water therapy.     2. I would recommend the pt start on Gabapentin to help with nerve related pain. We discussed the risks, benefits, and side effects to this medication including the mechanism of action and the pt understands and agrees.    3. I extensively reviewed the patients MRI findings (8-3-2023 [facility]) in detail, including review of the actual images and provided a detailed explanation of the findings using a spine model.     There is lumbar spondylosis  and disc degeneration at multiple levels.     4. The patient is a candidate for an LESI at L5/S1 , LESI at L2/3 versus  LUMBAR MEDIAL BRANCH L4/5 and L5/S1 bilaterally under fluoroscopic guidance to treat back pain. I spent time with the patient discussing all of the risks, benefits, and alternatives to this measure. Including but not limited to spinal infection, epidural hematoma/abscess, paralysis, nerve injury, steroid effects, and spinal headache. The patient understands and agrees to proceed.    She underwent an LESI L3/4 on 2-8-2024 and prior to that he medial branch blocks at L4/5 and L5/S1 last injection at this level was on 12- and 10-6-2023 and she reported 80% relief of her pain for 2 months time. She was able to sit and stand with much less pain.     I spent time with the patient reviewing their imaging and discussing the risks benefits and alternatives to the above plan. A total of 30 minutes was spent reviewing the data and greater than 50% of that time was with the patient during the face to face encounter discussing treatment options both surgical, non-surgical, and minimally invasive techniques.              Mike Matthews MD

## 2024-05-08 NOTE — PROGRESS NOTES
History Of Present Illness  Marysol Hill is a 84 y.o. female presenting with   Chief Complaint   Patient presents with    Follow-up     Patient with dementia returns with her  regarding ONGOING  chronic low back pain across her back worse on the right side. The pain is constant, worse with getting out of chair and better with sitting. The pain is a constant aching like sensation. Denies LE paresthesias, weakness, saddle anesthesia, bowel or bladder incontinence. To manage this pain the patient has attempted IBUPROFEN AND PT WITH NO RELIEF. The patients chronic GERD, Asthma are stable    PSHx  -Appendectomy  -Parathyroid surgery  -Hysterectomy    SocHx  -Quit tob 60 years ago  -Occ EtOH  -Worked for chief of surgery at [employer]    PAIN SCORE: 6 / 10    Neuro: Dr. Medrano  Surgeon: Dr. Fairchild          Past Medical History  She has a past medical history of Personal history of other diseases of the circulatory system, Personal history of other diseases of the circulatory system (08/31/2019), and Personal history of other endocrine, nutritional and metabolic disease.    Surgical History  She has a past surgical history that includes Appendectomy (01/13/2016); Hysterectomy (01/13/2016); Other surgical history (01/13/2016); and Other surgical history (10/28/2020).     Social History  She reports that she has quit smoking. Her smoking use included cigarettes. She has never used smokeless tobacco. She reports current alcohol use. She reports that she does not use drugs.    Family History  Family History   Problem Relation Name Age of Onset    Alzheimer's disease Mother      Dementia Mother      Other (CVA) Father          Allergies  Mushroom    Review of Systems    All other systems reviewed and negative for any deficits. Pertinent positives and negatives were considered in the medical decision making process.        Physical Exam  Resp 15   Wt 53.1 kg (117 lb)   SpO2 97%     General: Pt appears stated  age    Eyes: Conjunctiva non-icteric and lids without obvious rash or drooping. Pupils are symmetric    ENT: External ears are without deformity or rash. Hearing is grossly intact    Neck: No JVD noted, tracheal position midline.     Respiratory: No gasping or shortness of breath noted, no use of accessory muscles noted    Cardiovascular: Extremities show no edema or varicosities    Skin: No rashes or open lesions/ulcers identified on skin.     Musculoskeletal: Gait is grossly normal    Digits/nails show no clubbing or cyanosis    Exam of muscles/joints/bones shows no gross atrophy and no abnormal/involuntary movements in the head/neckNo asymmetry or masses noted in the head/neck    Stability: no subluxation noted on movement of bilateral upper extremities or head/neck    Strength: 4/5 in B/L lower extremities     Range of Motion: dec spinal flexion and extension due to pain.     Sensation: In tact     Cranial nerves 2-12 are grossly intact    Psychiatric: Pt is alert and oriented to time, place and person.         Assessment/Plan   No diagnosis found.      Lumbar spondylosis (721.3) (M47.816)   · Chronic low back pain (724.2,338.29) (M54.50,G89.29)    Provider Impressions    1. I have provided the patient with a list of physical therapy exercises to learn and perform to strengthen core, maintain stabilization, and reduce pain. We reviewed the exercises in detail and I encouraged them to perform them on a regular basis.    - The pt is scheduled to start water therapy.     2. I would recommend the pt start on Gabapentin to help with nerve related pain. We discussed the risks, benefits, and side effects to this medication including the mechanism of action and the pt understands and agrees.    3. I extensively reviewed the patients MRI findings (8-3-2023 [facility]) in detail, including review of the actual images and provided a detailed explanation of the findings using a spine model.     There is lumbar spondylosis  and disc degeneration at multiple levels.     4. The patient is a candidate for an LESI at L5/S1 , LESI at L2/3 versus  LUMBAR MEDIAL BRANCH L4/5 and L5/S1 bilaterally under fluoroscopic guidance to treat back pain. I spent time with the patient discussing all of the risks, benefits, and alternatives to this measure. Including but not limited to spinal infection, epidural hematoma/abscess, paralysis, nerve injury, steroid effects, and spinal headache. The patient understands and agrees to proceed.    She underwent an LESI L3/4 on 2-8-2024 and prior to that he medial branch blocks at L4/5 and L5/S1 last injection at this level was on 12- and 10-6-2023 and she reported 80% relief of her pain for 2 months time. She was able to sit and stand with much less pain.     I spent time with the patient reviewing their imaging and discussing the risks benefits and alternatives to the above plan. A total of 30 minutes was spent reviewing the data and greater than 50% of that time was with the patient during the face to face encounter discussing treatment options both surgical, non-surgical, and minimally invasive techniques.              Mike Matthews MD

## 2024-05-13 ENCOUNTER — LAB (OUTPATIENT)
Dept: LAB | Facility: LAB | Age: 84
End: 2024-05-13
Payer: MEDICARE

## 2024-05-13 DIAGNOSIS — N94.9 UNSPECIFIED CONDITION ASSOCIATED WITH FEMALE GENITAL ORGANS AND MENSTRUAL CYCLE: ICD-10-CM

## 2024-05-13 DIAGNOSIS — F03.90 UNSPECIFIED DEMENTIA, UNSPECIFIED SEVERITY, WITHOUT BEHAVIORAL DISTURBANCE, PSYCHOTIC DISTURBANCE, MOOD DISTURBANCE, AND ANXIETY (MULTI): Primary | ICD-10-CM

## 2024-05-13 DIAGNOSIS — E78.5 HYPERLIPIDEMIA, UNSPECIFIED: ICD-10-CM

## 2024-05-13 LAB
ALBUMIN SERPL BCP-MCNC: 3.9 G/DL (ref 3.4–5)
ALP SERPL-CCNC: 36 U/L (ref 33–136)
ALT SERPL W P-5'-P-CCNC: 39 U/L (ref 7–45)
ANION GAP SERPL CALC-SCNC: 12 MMOL/L (ref 10–20)
AST SERPL W P-5'-P-CCNC: 41 U/L (ref 9–39)
BASOPHILS # BLD AUTO: 0.03 X10*3/UL (ref 0–0.1)
BASOPHILS # BLD AUTO: 0.03 X10*3/UL (ref 0–0.1)
BASOPHILS NFR BLD AUTO: 0.4 %
BASOPHILS NFR BLD AUTO: 0.4 %
BILIRUB SERPL-MCNC: 0.5 MG/DL (ref 0–1.2)
BUN SERPL-MCNC: 13 MG/DL (ref 6–23)
CALCIUM SERPL-MCNC: 9.2 MG/DL (ref 8.6–10.6)
CHLORIDE SERPL-SCNC: 104 MMOL/L (ref 98–107)
CHOLEST SERPL-MCNC: 144 MG/DL (ref 0–199)
CHOLESTEROL/HDL RATIO: 2.6
CO2 SERPL-SCNC: 30 MMOL/L (ref 21–32)
CREAT SERPL-MCNC: 0.75 MG/DL (ref 0.5–1.05)
EGFRCR SERPLBLD CKD-EPI 2021: 79 ML/MIN/1.73M*2
EOSINOPHIL # BLD AUTO: 0.26 X10*3/UL (ref 0–0.4)
EOSINOPHIL # BLD AUTO: 0.26 X10*3/UL (ref 0–0.4)
EOSINOPHIL NFR BLD AUTO: 3.6 %
EOSINOPHIL NFR BLD AUTO: 3.6 %
ERYTHROCYTE [DISTWIDTH] IN BLOOD BY AUTOMATED COUNT: 12.6 % (ref 11.5–14.5)
GLUCOSE SERPL-MCNC: 90 MG/DL (ref 74–99)
HCT VFR BLD AUTO: 46.7 % (ref 36–46)
HDLC SERPL-MCNC: 56 MG/DL
HGB BLD-MCNC: 14.3 G/DL (ref 12–16)
IMM GRANULOCYTES # BLD AUTO: 0.02 X10*3/UL (ref 0–0.5)
IMM GRANULOCYTES # BLD AUTO: 0.02 X10*3/UL (ref 0–0.5)
IMM GRANULOCYTES NFR BLD AUTO: 0.3 % (ref 0–0.9)
IMM GRANULOCYTES NFR BLD AUTO: 0.3 % (ref 0–0.9)
LDLC SERPL CALC-MCNC: 64 MG/DL
LYMPHOCYTES # BLD AUTO: 1.7 X10*3/UL (ref 0.8–3)
LYMPHOCYTES # BLD AUTO: 1.7 X10*3/UL (ref 0.8–3)
LYMPHOCYTES NFR BLD AUTO: 23.3 %
LYMPHOCYTES NFR BLD AUTO: 23.3 %
MCH RBC QN AUTO: 31.6 PG (ref 26–34)
MCHC RBC AUTO-ENTMCNC: 30.6 G/DL (ref 32–36)
MCV RBC AUTO: 103 FL (ref 80–100)
MONOCYTES # BLD AUTO: 0.56 X10*3/UL (ref 0.05–0.8)
MONOCYTES # BLD AUTO: 0.56 X10*3/UL (ref 0.05–0.8)
MONOCYTES NFR BLD AUTO: 7.7 %
MONOCYTES NFR BLD AUTO: 7.7 %
NEUTROPHILS # BLD AUTO: 4.72 X10*3/UL (ref 1.6–5.5)
NEUTROPHILS # BLD AUTO: 4.72 X10*3/UL (ref 1.6–5.5)
NEUTROPHILS NFR BLD AUTO: 64.7 %
NEUTROPHILS NFR BLD AUTO: 64.7 %
NON HDL CHOLESTEROL: 88 MG/DL (ref 0–149)
NRBC BLD-RTO: 0 /100 WBCS (ref 0–0)
PLATELET # BLD AUTO: 222 X10*3/UL (ref 150–450)
POTASSIUM SERPL-SCNC: 4.5 MMOL/L (ref 3.5–5.3)
PROT SERPL-MCNC: 6.2 G/DL (ref 6.4–8.2)
RBC # BLD AUTO: 4.53 X10*6/UL (ref 4–5.2)
SODIUM SERPL-SCNC: 141 MMOL/L (ref 136–145)
TRIGL SERPL-MCNC: 120 MG/DL (ref 0–149)
TSH SERPL-ACNC: 1.92 MIU/L (ref 0.44–3.98)
VIT B12 SERPL-MCNC: 411 PG/ML (ref 211–911)
VLDL: 24 MG/DL (ref 0–40)
WBC # BLD AUTO: 7.3 X10*3/UL (ref 4.4–11.3)

## 2024-05-13 PROCEDURE — 85025 COMPLETE CBC W/AUTO DIFF WBC: CPT

## 2024-05-13 PROCEDURE — 36415 COLL VENOUS BLD VENIPUNCTURE: CPT

## 2024-05-13 PROCEDURE — 84443 ASSAY THYROID STIM HORMONE: CPT

## 2024-05-13 PROCEDURE — 80061 LIPID PANEL: CPT

## 2024-05-13 PROCEDURE — 80053 COMPREHEN METABOLIC PANEL: CPT

## 2024-05-13 PROCEDURE — 82607 VITAMIN B-12: CPT

## 2024-05-16 PROBLEM — I10 PRIMARY HYPERTENSION: Status: ACTIVE | Noted: 2023-09-29

## 2024-05-16 PROBLEM — J44.9 CHRONIC OBSTRUCTIVE PULMONARY DISEASE (MULTI): Status: ACTIVE | Noted: 2023-10-02
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
## 2024-05-16 PROBLEM — N95.8: Status: ACTIVE | Noted: 2024-04-18

## 2024-05-16 PROBLEM — K86.2 CYST OF PANCREAS (HHS-HCC): Status: ACTIVE | Noted: 2023-12-26

## 2024-05-16 PROBLEM — F03.90 DEMENTIA (MULTI): Status: ACTIVE | Noted: 2020-05-18

## 2024-05-16 PROBLEM — N94.89: Status: ACTIVE | Noted: 2024-04-18

## 2024-05-16 PROBLEM — R23.0 PERIPHERAL CYANOSIS: Status: ACTIVE | Noted: 2024-05-16

## 2024-05-16 PROBLEM — E27.8 ADRENAL MASS, RIGHT (MULTI): Status: ACTIVE | Noted: 2024-01-11

## 2024-05-30 ENCOUNTER — HOSPITAL ENCOUNTER (OUTPATIENT)
Dept: RADIOLOGY | Facility: CLINIC | Age: 84
Discharge: HOME | End: 2024-05-30
Payer: MEDICARE

## 2024-05-30 ENCOUNTER — HOSPITAL ENCOUNTER (OUTPATIENT)
Dept: PAIN MEDICINE | Facility: CLINIC | Age: 84
Discharge: HOME | End: 2024-05-30
Payer: MEDICARE

## 2024-05-30 VITALS
SYSTOLIC BLOOD PRESSURE: 115 MMHG | RESPIRATION RATE: 18 BRPM | DIASTOLIC BLOOD PRESSURE: 60 MMHG | TEMPERATURE: 96.4 F | WEIGHT: 117 LBS | HEART RATE: 60 BPM | BODY MASS INDEX: 19.97 KG/M2 | HEIGHT: 64 IN | OXYGEN SATURATION: 98 %

## 2024-05-30 DIAGNOSIS — M54.16 LUMBAR NEURITIS: ICD-10-CM

## 2024-05-30 PROCEDURE — 2500000005 HC RX 250 GENERAL PHARMACY W/O HCPCS

## 2024-05-30 PROCEDURE — 2500000004 HC RX 250 GENERAL PHARMACY W/ HCPCS (ALT 636 FOR OP/ED)

## 2024-05-30 PROCEDURE — 62323 NJX INTERLAMINAR LMBR/SAC: CPT | Performed by: ANESTHESIOLOGY

## 2024-05-30 PROCEDURE — A4216 STERILE WATER/SALINE, 10 ML: HCPCS

## 2024-05-30 RX ORDER — TRIAMCINOLONE ACETONIDE 40 MG/ML
INJECTION, SUSPENSION INTRA-ARTICULAR; INTRAMUSCULAR
Status: COMPLETED
Start: 2024-05-30 | End: 2024-05-30

## 2024-05-30 RX ORDER — SODIUM CHLORIDE 9 MG/ML
INJECTION, SOLUTION INTRAMUSCULAR; INTRAVENOUS; SUBCUTANEOUS
Status: COMPLETED
Start: 2024-05-30 | End: 2024-05-30

## 2024-05-30 RX ORDER — LIDOCAINE HYDROCHLORIDE 5 MG/ML
INJECTION, SOLUTION INFILTRATION; INTRAVENOUS
Status: COMPLETED
Start: 2024-05-30 | End: 2024-05-30

## 2024-05-30 RX ADMIN — SODIUM CHLORIDE 10 ML: 9 INJECTION, SOLUTION INTRAMUSCULAR; INTRAVENOUS; SUBCUTANEOUS at 14:34

## 2024-05-30 RX ADMIN — LIDOCAINE HYDROCHLORIDE 250 MG: 5 INJECTION, SOLUTION INFILTRATION at 14:34

## 2024-05-30 RX ADMIN — TRIAMCINOLONE ACETONIDE 40 MG: 40 INJECTION, SUSPENSION INTRA-ARTICULAR; INTRAMUSCULAR at 14:34

## 2024-05-30 SDOH — ECONOMIC STABILITY: FOOD INSECURITY: WITHIN THE PAST 12 MONTHS, YOU WORRIED THAT YOUR FOOD WOULD RUN OUT BEFORE YOU GOT MONEY TO BUY MORE.: NEVER TRUE

## 2024-05-30 SDOH — ECONOMIC STABILITY: FOOD INSECURITY: WITHIN THE PAST 12 MONTHS, THE FOOD YOU BOUGHT JUST DIDN'T LAST AND YOU DIDN'T HAVE MONEY TO GET MORE.: NEVER TRUE

## 2024-05-30 ASSESSMENT — LIFESTYLE VARIABLES
HOW OFTEN DO YOU HAVE A DRINK CONTAINING ALCOHOL: MONTHLY OR LESS
SKIP TO QUESTIONS 9-10: 1
HOW OFTEN DO YOU HAVE SIX OR MORE DRINKS ON ONE OCCASION: NEVER
AUDIT-C TOTAL SCORE: 1
HOW MANY STANDARD DRINKS CONTAINING ALCOHOL DO YOU HAVE ON A TYPICAL DAY: 1 OR 2

## 2024-05-30 ASSESSMENT — PAIN - FUNCTIONAL ASSESSMENT: PAIN_FUNCTIONAL_ASSESSMENT: 0-10

## 2024-05-30 ASSESSMENT — PAIN SCALES - GENERAL
PAINLEVEL_OUTOF10: 5 - MODERATE PAIN
PAINLEVEL_OUTOF10: 3

## 2024-05-30 ASSESSMENT — COLUMBIA-SUICIDE SEVERITY RATING SCALE - C-SSRS
6. HAVE YOU EVER DONE ANYTHING, STARTED TO DO ANYTHING, OR PREPARED TO DO ANYTHING TO END YOUR LIFE?: NO
1. IN THE PAST MONTH, HAVE YOU WISHED YOU WERE DEAD OR WISHED YOU COULD GO TO SLEEP AND NOT WAKE UP?: NO
2. HAVE YOU ACTUALLY HAD ANY THOUGHTS OF KILLING YOURSELF?: NO

## 2024-05-30 ASSESSMENT — PAIN DESCRIPTION - DESCRIPTORS: DESCRIPTORS: ACHING

## 2024-05-30 ASSESSMENT — ENCOUNTER SYMPTOMS
OCCASIONAL FEELINGS OF UNSTEADINESS: 0
DEPRESSION: 0
LOSS OF SENSATION IN FEET: 0

## 2024-05-30 NOTE — OP NOTE
5/30/2024    Pre procedure Diagnosis: Lumbar neuritis  Post procedure Diagnosis: Lumbar neuritis    Procedure:     1. L5/S1 interlaminar epidural steroid injection   2. Fluoroscopic guidance     Complications: None    Assistants: None     EBL: None    PROCEDURE: The patient was identified in the preoperative area. After risks and benefits were explained, informed consent was obtained. The patient was brought back to the operating room and placed in the prone position on the operating table. Standard ASA monitors were applied and monitored throughout the procedure. Their vital signs remained stable throughout the procedure. The patient's lumbosacral spine was prepped and draped in usual sterile fashion. Using fluoroscopic guidance the skin overlying the trajectory to the L5/S1 space was anesthetized with a total of 5 ml of 0.5% Lidocaine. Thereafter, a 3.5 in long 20G TouhPOWWOW needle was advanced through the anesthitized skin. Then, the needle was advanced into the L5/S1 ligamentum flavum under multiplanar fluoroscopic guidance.  Then using a loss of resistance syringe and technique the epidural space was accessed. Next a total of 2 ml of OMNIPAQUE 240 radiocontrast dye was injected and it showed appropriate epidural spread and no vascular uptake. After negative aspiration for heme, or CSF a total of 4mL of Preservative Free Normal Saline and 40 mg of KENALOG was injected. The needle was removed and a sterile dressing was applied. The patient tolerated the procedure well and was transported to PACU in good condition.    PLAN: The pt will follow up in the office in one to two months to report their results with the procedure. Discharge instructions were reviewed in recovery and provided to the patient in writing. They were advised to call should they have any questions or concerns.

## 2024-05-30 NOTE — OP NOTE
5/30/2024    Pre procedure Diagnosis: Lumbar neuritis  Post procedure Diagnosis: Lumbar neuritis    Procedure:     1. L5/S1 interlaminar epidural steroid injection   2. Fluoroscopic guidance     Complications: None    Assistants: None     EBL: None    PROCEDURE: The patient was identified in the preoperative area. After risks and benefits were explained, informed consent was obtained. The patient was brought back to the operating room and placed in the prone position on the operating table. Standard ASA monitors were applied and monitored throughout the procedure. Their vital signs remained stable throughout the procedure. The patient's lumbosacral spine was prepped and draped in usual sterile fashion. Using fluoroscopic guidance the skin overlying the trajectory to the L5/S1 space was anesthetized with a total of 5 ml of 0.5% Lidocaine. Thereafter, a 3.5 in long 20G Touhy needle was advanced through the anesthitized skin. Then, the needle was advanced into the L5/S1 ligamentum flavum under multiplanar fluoroscopic guidance.  Then using a loss of resistance syringe and technique the epidural space was accessed. After negative aspiration for heme, or CSF a total of 4mL of Preservative Free Normal Saline and 40 mg of KENALOG was injected. The needle was removed and a sterile dressing was applied. The patient tolerated the procedure well and was transported to PACU in good condition.    PLAN: The pt will follow up in the office in one to two months to report their results with the procedure. Discharge instructions were reviewed in recovery and provided to the patient in writing. They were advised to call should they have any questions or concerns.

## 2024-06-02 NOTE — PROGRESS NOTES
Subjective   Marysol Hill is a 84 y.o. female.    Chief Complaint:  Follow-up valvular heart disease, coronary artery disease.  Follow-up pacemaker.    HPI    Most of her history was obtained from her  as she is developing memory issues.  Denies anginal symptoms.  No increased shortness of breath or exertional dyspnea.   reports no anginal symptoms.  Activity levels are however getting more reduced but this is multifactorial.    On September 29, 2020 she underwent permanent pacemaker insertion with a dual chamber pacer. The device is a [employer] MRI model [id number].     Her past cardiac history is otherwise unremarkable. She denies any history of hypertension. There is no past history of myocardial infarction. There is no history of diabetes and no smoking history. She does have a history of hyperlipidemia. There is a positive family history of cardiac issues. Both father and mother had a pacemaker placed.     Her diagnosis of coronary artery disease is based on the finding of calcifications in the distribution the coronary arteries.     She has had a history of parathyroid surgery. She has a past history of pneumonia. She sees Dr. Lacy for glaucoma. She is on Timolol eyedrops.     She usually spends the winter with her  in [facility]     Allergies  Medication    · No Known Drug Allergies   Recorded By: Clarita Calvert; 1/13/2016 11:53:23 AM     Family History  Mother    · Family history of Alzheimer's dementia  Father    · Family history of cerebrovascular accident (CVA) (V17.1) (Z82.3)     Social History  Problems     ·  is POA and handles finances.     · Former smoker (V15.82) (Z87.891)   · quit 1962      · Occasional alcohol use    Review of Systems   Constitutional: Positive for malaise/fatigue.   Cardiovascular:  Positive for dyspnea on exertion.   Musculoskeletal:  Positive for arthritis and back pain.   Psychiatric/Behavioral:  Positive for memory loss.    All other  systems reviewed and are negative.      Current Outpatient Medications   Medication Sig Dispense Refill    albuterol 2.5 mg /3 mL (0.083 %) nebulizer solution Inhale 3 mL every 6 hours if needed.      alendronate (Fosamax) 70 mg tablet Take 1 tablet (70 mg) by mouth 1 (one) time per week.      calcium carbonate 600 mg calcium (1,500 mg) tablet Take 1 tablet (1,500 mg) by mouth once daily.      CALCIUM CITRATE-VITAMIN D3 ORAL Take 1 tablet by mouth once daily.      CALCIUM POLYCARBOPHIL ORAL Take by mouth.      cholecalciferol (Vitamin D-3) 25 MCG (1000 UT) tablet Take 1 tablet (25 mcg) by mouth once daily.      ciprofloxacin (Ciloxan) 0.3 % ophthalmic solution       cyanocobalamin (Vitamin B-12) 1,000 mcg tablet Take 1 tablet (1,000 mcg) by mouth once daily.      denosumab (Prolia) 60 mg/mL syringe Inject under the skin.      donepezil (Aricept) 10 mg tablet Take 1 tablet (10 mg) by mouth once daily at bedtime. 90 tablet 2    escitalopram (Lexapro) 20 mg tablet Take 0.5 tablets (10 mg) by mouth once daily.      FLUTICASONE PROPIONATE INHL Inhale.      hydrOXYzine HCL (Atarax) 10 mg tablet Take 1 tablet (10 mg) by mouth 2 times a day.      multivit-minerals/folic acid (CENTRUM ADULTS ORAL) Take 1 tablet by mouth once daily.      omega-3 fatty acids-fish oil 360-1,200 mg capsule Take by mouth.      omeprazole (PriLOSEC) 40 mg DR capsule Take 1 capsule (40 mg) by mouth once daily.      psyllium seed, with sugar, (METAMUCIL, SUGAR, ORAL) Take by mouth 3 times a day.      rosuvastatin (Crestor) 40 mg tablet 1 tablet (40 mg).      simvastatin (Zocor) 40 mg tablet Take 0.5 tablets (20 mg) by mouth once daily at bedtime.      timolol (Timoptic) 0.5 % ophthalmic solution Administer 1 drop into affected eye(s) twice a day.      vit A/vit C/vit E/zinc/copper (PRESERVISION AREDS ORAL) Take 1 capsule by mouth once daily.      vit C-E-cupric-zinc-lutein (PreserVision Lutein) 226-90-0.8-5 mg capsule capsule Take 1 capsule (5 mg)  by mouth once daily.       No current facility-administered medications for this visit.        Visit Vitals  OB Status Postmenopausal   Smoking Status Former        Objective     Constitutional:       Appearance: Not in distress.   Neck:      Vascular: JVD normal.   Pulmonary:      Breath sounds: Normal breath sounds.   Cardiovascular:      Normal rate. Regular rhythm. Normal S1. Normal S2.       Murmurs: There is a grade 1/6 systolic murmur.      No gallop.    Pulses:     Intact distal pulses.   Edema:     Peripheral edema absent.   Abdominal:      General: There is no distension.      Palpations: Abdomen is soft.   Neurological:      Mental Status: Alert.         Lab Review:   Lab Results   Component Value Date     05/13/2024    K 4.5 05/13/2024     05/13/2024    CO2 30 05/13/2024    BUN 13 05/13/2024    CREATININE 0.75 05/13/2024    GLUCOSE 90 05/13/2024    CALCIUM 9.2 05/13/2024     Lab Results   Component Value Date    CHOL 144 05/13/2024    TRIG 120 05/13/2024    HDL 56.0 05/13/2024       Assessment:    1.  Coronary artery disease.  Based on the finding of calcifications in the distribution of the coronary arteries.  Conservative medical management.  Advancing dementia.    2.  Status post pacer.  Pacer check demonstrates no significant arrhythmias.    3.  Hyperlipidemia.  Cholesterol is 144, HDL 56, LDL 64.    4.  Valvular heart disease.  Today's echocardiographic study shows mild aortic regurgitation with overall normal left ventricular function.

## 2024-06-03 ENCOUNTER — OFFICE VISIT (OUTPATIENT)
Dept: CARDIOLOGY | Facility: CLINIC | Age: 84
End: 2024-06-03
Payer: MEDICARE

## 2024-06-03 ENCOUNTER — HOSPITAL ENCOUNTER (OUTPATIENT)
Dept: CARDIOLOGY | Facility: CLINIC | Age: 84
Discharge: HOME | End: 2024-06-03
Payer: MEDICARE

## 2024-06-03 VITALS
BODY MASS INDEX: 19.46 KG/M2 | HEART RATE: 60 BPM | DIASTOLIC BLOOD PRESSURE: 64 MMHG | HEIGHT: 64 IN | SYSTOLIC BLOOD PRESSURE: 122 MMHG | OXYGEN SATURATION: 96 % | WEIGHT: 114 LBS

## 2024-06-03 DIAGNOSIS — I10 PRIMARY HYPERTENSION: ICD-10-CM

## 2024-06-03 DIAGNOSIS — I35.1 MILD AORTIC REGURGITATION: ICD-10-CM

## 2024-06-03 DIAGNOSIS — E78.2 MIXED HYPERLIPIDEMIA: ICD-10-CM

## 2024-06-03 DIAGNOSIS — Z95.0 PACEMAKER: ICD-10-CM

## 2024-06-03 DIAGNOSIS — I25.10 CORONARY ARTERY DISEASE INVOLVING NATIVE CORONARY ARTERY OF NATIVE HEART WITHOUT ANGINA PECTORIS: ICD-10-CM

## 2024-06-03 DIAGNOSIS — R00.1 SINUS BRADYCARDIA: Primary | ICD-10-CM

## 2024-06-03 DIAGNOSIS — I35.0 NONRHEUMATIC AORTIC (VALVE) STENOSIS: ICD-10-CM

## 2024-06-03 LAB
AORTIC VALVE MEAN GRADIENT: 3.1 MMHG
AORTIC VALVE PEAK VELOCITY: 1.25 M/S
AV PEAK GRADIENT: 6.3 MMHG
AVA (PEAK VEL): 2.78 CM2
AVA (VTI): 2.64 CM2
EJECTION FRACTION APICAL 4 CHAMBER: 62.3
LEFT VENTRICLE INTERNAL DIMENSION DIASTOLE: 3.99 CM (ref 3.5–6)
LEFT VENTRICULAR OUTFLOW TRACT DIAMETER: 1.96 CM
LV EJECTION FRACTION BIPLANE: 65 %
MITRAL VALVE E/A RATIO: 0.72
MITRAL VALVE E/E' RATIO: 9.66
RIGHT VENTRICLE FREE WALL PEAK S': 9 CM/S
TRICUSPID ANNULAR PLANE SYSTOLIC EXCURSION: 2.1 CM

## 2024-06-03 PROCEDURE — 99213 OFFICE O/P EST LOW 20 MIN: CPT | Performed by: INTERNAL MEDICINE

## 2024-06-03 PROCEDURE — 1159F MED LIST DOCD IN RCRD: CPT | Performed by: INTERNAL MEDICINE

## 2024-06-03 PROCEDURE — 1036F TOBACCO NON-USER: CPT | Performed by: INTERNAL MEDICINE

## 2024-06-03 PROCEDURE — 3074F SYST BP LT 130 MM HG: CPT | Performed by: INTERNAL MEDICINE

## 2024-06-03 PROCEDURE — 93306 TTE W/DOPPLER COMPLETE: CPT | Performed by: INTERNAL MEDICINE

## 2024-06-03 PROCEDURE — 93306 TTE W/DOPPLER COMPLETE: CPT

## 2024-06-03 PROCEDURE — 3078F DIAST BP <80 MM HG: CPT | Performed by: INTERNAL MEDICINE

## 2024-06-03 PROCEDURE — 93000 ELECTROCARDIOGRAM COMPLETE: CPT | Performed by: INTERNAL MEDICINE

## 2024-06-03 RX ORDER — CLOBETASOL PROPIONATE 0.5 MG/G
OINTMENT TOPICAL
COMMUNITY
Start: 2024-05-15

## 2024-06-03 ASSESSMENT — ENCOUNTER SYMPTOMS
MEMORY LOSS: 1
BACK PAIN: 1
DYSPNEA ON EXERTION: 1

## 2024-06-09 DIAGNOSIS — E78.2 MIXED HYPERLIPIDEMIA: ICD-10-CM

## 2024-06-10 RX ORDER — ROSUVASTATIN CALCIUM 40 MG/1
40 TABLET, COATED ORAL DAILY
Qty: 90 TABLET | Refills: 3 | Status: SHIPPED | OUTPATIENT
Start: 2024-06-10

## 2024-06-20 ENCOUNTER — HOSPITAL ENCOUNTER (OUTPATIENT)
Dept: CARDIOLOGY | Facility: CLINIC | Age: 84
Discharge: HOME | End: 2024-06-20
Payer: MEDICARE

## 2024-06-20 DIAGNOSIS — Z95.0 PRESENCE OF CARDIAC PACEMAKER: ICD-10-CM

## 2024-06-20 DIAGNOSIS — I49.5 SICK SINUS SYNDROME (MULTI): ICD-10-CM

## 2024-07-01 ENCOUNTER — OFFICE VISIT (OUTPATIENT)
Dept: PAIN MEDICINE | Facility: CLINIC | Age: 84
End: 2024-07-01
Payer: MEDICARE

## 2024-07-01 VITALS
WEIGHT: 114 LBS | HEART RATE: 60 BPM | OXYGEN SATURATION: 98 % | BODY MASS INDEX: 19.57 KG/M2 | SYSTOLIC BLOOD PRESSURE: 120 MMHG | TEMPERATURE: 97.9 F | DIASTOLIC BLOOD PRESSURE: 79 MMHG | RESPIRATION RATE: 15 BRPM

## 2024-07-01 DIAGNOSIS — M47.816 LUMBAR SPONDYLOSIS: Primary | ICD-10-CM

## 2024-07-01 DIAGNOSIS — M51.37 DEGENERATION OF LUMBAR OR LUMBOSACRAL INTERVERTEBRAL DISC: ICD-10-CM

## 2024-07-01 DIAGNOSIS — M54.41 CHRONIC RIGHT-SIDED LOW BACK PAIN WITH RIGHT-SIDED SCIATICA: ICD-10-CM

## 2024-07-01 DIAGNOSIS — G89.29 CHRONIC RIGHT-SIDED LOW BACK PAIN WITH RIGHT-SIDED SCIATICA: ICD-10-CM

## 2024-07-01 PROCEDURE — 99214 OFFICE O/P EST MOD 30 MIN: CPT | Performed by: ANESTHESIOLOGY

## 2024-07-01 ASSESSMENT — PAIN SCALES - GENERAL
PAINLEVEL_OUTOF10: 5 - MODERATE PAIN
PAINLEVEL: 5

## 2024-07-01 ASSESSMENT — PAIN DESCRIPTION - DESCRIPTORS: DESCRIPTORS: SHARP

## 2024-07-01 ASSESSMENT — ENCOUNTER SYMPTOMS
LOSS OF SENSATION IN FEET: 0
OCCASIONAL FEELINGS OF UNSTEADINESS: 1

## 2024-07-01 ASSESSMENT — PAIN - FUNCTIONAL ASSESSMENT: PAIN_FUNCTIONAL_ASSESSMENT: 0-10

## 2024-07-01 NOTE — PROGRESS NOTES
History Of Present Illness  Marysol Hill is a 84 y.o. female presenting with   Chief Complaint   Patient presents with    Follow-up     Patient who has a hx of dementia returns with her  regarding IMPROVED chronic low back pain across her back worse on the right side. The pain is constant, worse with getting out of chair and better with sitting. The pain is a constant aching like sensation. Denies LE paresthesias, weakness, saddle anesthesia, bowel or bladder incontinence. To manage this pain the patient has attempted IBUPROFEN AND PT WITH NO RELIEF. The patients chronic GERD, Asthma are stable    PSHx  -Appendectomy  -Parathyroid surgery  -Hysterectomy    SocHx  -Quit tob 60 years ago  -Occ EtOH  -Worked for chief of surgery at [employer]    PAIN SCORE:  5 / 10     Neuro: Dr. Medrano  Surgeon: Dr. Fairchild          Past Medical History  She has a past medical history of Personal history of other diseases of the circulatory system, Personal history of other diseases of the circulatory system (08/31/2019), and Personal history of other endocrine, nutritional and metabolic disease.    Surgical History  She has a past surgical history that includes Appendectomy (01/13/2016); Hysterectomy (01/13/2016); Other surgical history (01/13/2016); and Other surgical history (10/28/2020).     Social History  She reports that she has quit smoking. Her smoking use included cigarettes. She has never used smokeless tobacco. She reports that she does not currently use alcohol. She reports that she does not use drugs.    Family History  Family History   Problem Relation Name Age of Onset    Alzheimer's disease Mother      Dementia Mother      Other (CVA) Father          Allergies  Mushroom    Review of Systems    All other systems reviewed and negative for any deficits. Pertinent positives and negatives were considered in the medical decision making process.        Physical Exam  /79   Pulse 60   Temp 36.6 °C  (97.9 °F)   Resp 15   Wt 51.7 kg (114 lb)   SpO2 98%     General: Pt appears stated age    Eyes: Conjunctiva non-icteric and lids without obvious rash or drooping. Pupils are symmetric    ENT: External ears are without deformity or rash. Hearing is grossly intact    Neck: No JVD noted, tracheal position midline.     Respiratory: No gasping or shortness of breath noted, no use of accessory muscles noted    Cardiovascular: Extremities show no edema or varicosities    Skin: No rashes or open lesions/ulcers identified on skin.     Musculoskeletal: Gait is grossly normal    Digits/nails show no clubbing or cyanosis    Exam of muscles/joints/bones shows no gross atrophy and no abnormal/involuntary movements in the head/neckNo asymmetry or masses noted in the head/neck    Stability: no subluxation noted on movement of bilateral upper extremities or head/neck    Strength: 4/5 in B/L lower extremities     Range of Motion: dec spinal flexion and extension due to pain.     Sensation: In tact     Cranial nerves 2-12 are grossly intact    Psychiatric: Pt is alert and oriented to time, place and person.         Assessment/Plan   1. Lumbar spondylosis        2. Chronic right-sided low back pain with right-sided sciatica              Lumbar spondylosis (721.3) (M47.816)   Chronic low back pain (724.2,338.29) (M54.50,G89.29)    Provider Impressions    1. I have provided the patient with a list of physical therapy exercises to learn and perform to strengthen core, maintain stabilization, and reduce pain. We reviewed the exercises in detail and I encouraged them to perform them on a regular basis.    - The pt is scheduled to start water therapy.     2. I would recommend the pt start on Gabapentin to help with nerve related pain. We discussed the risks, benefits, and side effects to this medication including the mechanism of action and the pt understands and agrees.    3. I extensively reviewed the patients MRI findings (8-3-2023  [facility]) in detail, including review of the actual images and provided a detailed explanation of the findings using a spine model.     There is lumbar spondylosis and disc degeneration at multiple levels.     4. The patient is a candidate for an LESI at L5/S1 , LESI at L2/3 versus  LUMBAR MEDIAL BRANCH L4/5 and L5/S1 bilaterally under fluoroscopic guidance to treat back pain. I spent time with the patient discussing all of the risks, benefits, and alternatives to this measure. Including but not limited to spinal infection, epidural hematoma/abscess, paralysis, nerve injury, steroid effects, and spinal headache. The patient understands and agrees to proceed.    She underwent an LESI at L5/S1 on 5- and prior to that an LESI L3/4 on 2-8-2024 and prior to that he medial branch blocks at L4/5 and L5/S1 last injection at this level was on 12- and 10-6-2023 and she reported 80% relief of her pain for 2 months time. She was able to sit and stand with much less pain.     I spent time with the patient reviewing their imaging and discussing the risks benefits and alternatives to the above plan. A total of 30 minutes was spent reviewing the data and greater than 50% of that time was with the patient during the face to face encounter discussing treatment options both surgical, non-surgical, and minimally invasive techniques.              Mike Matthews MD

## 2024-08-02 ENCOUNTER — TELEPHONE (OUTPATIENT)
Dept: INFUSION THERAPY | Facility: CLINIC | Age: 84
End: 2024-08-02
Payer: MEDICARE

## 2024-08-26 ENCOUNTER — OFFICE VISIT (OUTPATIENT)
Dept: PAIN MEDICINE | Facility: CLINIC | Age: 84
End: 2024-08-26
Payer: MEDICARE

## 2024-08-26 VITALS
HEART RATE: 60 BPM | OXYGEN SATURATION: 97 % | DIASTOLIC BLOOD PRESSURE: 53 MMHG | SYSTOLIC BLOOD PRESSURE: 111 MMHG | WEIGHT: 114 LBS | RESPIRATION RATE: 15 BRPM | BODY MASS INDEX: 19.57 KG/M2 | TEMPERATURE: 97.2 F

## 2024-08-26 DIAGNOSIS — M54.42 CHRONIC BILATERAL LOW BACK PAIN WITH BILATERAL SCIATICA: ICD-10-CM

## 2024-08-26 DIAGNOSIS — G89.29 CHRONIC BILATERAL LOW BACK PAIN WITH BILATERAL SCIATICA: ICD-10-CM

## 2024-08-26 DIAGNOSIS — M47.816 LUMBAR SPONDYLOSIS: Primary | ICD-10-CM

## 2024-08-26 DIAGNOSIS — M54.41 CHRONIC BILATERAL LOW BACK PAIN WITH BILATERAL SCIATICA: ICD-10-CM

## 2024-08-26 DIAGNOSIS — M54.50 CHRONIC LOW BACK PAIN, UNSPECIFIED BACK PAIN LATERALITY, UNSPECIFIED WHETHER SCIATICA PRESENT: ICD-10-CM

## 2024-08-26 DIAGNOSIS — G89.29 CHRONIC LOW BACK PAIN, UNSPECIFIED BACK PAIN LATERALITY, UNSPECIFIED WHETHER SCIATICA PRESENT: ICD-10-CM

## 2024-08-26 PROCEDURE — 99214 OFFICE O/P EST MOD 30 MIN: CPT | Performed by: ANESTHESIOLOGY

## 2024-08-26 ASSESSMENT — PAIN SCALES - GENERAL
PAINLEVEL: 7
PAINLEVEL_OUTOF10: 7

## 2024-08-26 ASSESSMENT — ENCOUNTER SYMPTOMS
OCCASIONAL FEELINGS OF UNSTEADINESS: 1
LOSS OF SENSATION IN FEET: 0

## 2024-08-26 ASSESSMENT — PAIN - FUNCTIONAL ASSESSMENT: PAIN_FUNCTIONAL_ASSESSMENT: 0-10

## 2024-08-26 NOTE — PROGRESS NOTES
History Of Present Illness  Marysol Hill is a 84 y.o. female presenting with   Chief Complaint   Patient presents with   • Follow-up     Patient who has a hx of significant dementia returns with her  regarding flare up of her chronic low back pain across her back worse on the right side. The pain is constant, worse with getting out of chair and better with sitting. The pain is a constant aching like sensation. Denies LE paresthesias, weakness, saddle anesthesia, bowel or bladder incontinence. To manage this pain the patient has attempted IBUPROFEN AND PT WITH NO RELIEF. 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 Medrano  Surgeon: Dr. Fairchild     Past Medical History  She has a past medical history of Personal history of other diseases of the circulatory system, Personal history of other diseases of the circulatory system (08/31/2019), and Personal history of other endocrine, nutritional and metabolic disease.    Surgical History  She has a past surgical history that includes Appendectomy (01/13/2016); Hysterectomy (01/13/2016); Other surgical history (01/13/2016); and Other surgical history (10/28/2020).     Social History  She reports that she has quit smoking. Her smoking use included cigarettes. She has never used smokeless tobacco. She reports that she does not currently use alcohol. She reports that she does not use drugs.    Family History  Family History   Problem Relation Name Age of Onset   • Alzheimer's disease Mother     • Dementia Mother     • Other (CVA) Father          Allergies  Mushroom    Review of Systems    All other systems reviewed and negative for any deficits. Pertinent positives and negatives were considered in the medical decision making process.        Physical Exam  /53   Pulse  60   Temp 36.2 °C (97.2 °F)   Resp 15   Wt 51.7 kg (114 lb)   SpO2 97%     General: Pt appears stated age    Eyes: Conjunctiva non-icteric and lids without obvious rash or drooping. Pupils are symmetric    ENT: External ears are without deformity or rash. Hearing is grossly intact    Neck: No JVD noted, tracheal position midline.     Respiratory: No gasping or shortness of breath noted, no use of accessory muscles noted    Cardiovascular: Extremities show no edema or varicosities    Skin: No rashes or open lesions/ulcers identified on skin.     Musculoskeletal: Gait is grossly normal    Digits/nails show no clubbing or cyanosis    Exam of muscles/joints/bones shows no gross atrophy and no abnormal/involuntary movements in the head/neckNo asymmetry or masses noted in the head/neck    Stability: no subluxation noted on movement of bilateral upper extremities or head/neck    Strength: 4/5 in B/L lower extremities     Range of Motion: dec spinal flexion and extension due to pain.     Sensation: In tact     Cranial nerves 2-12 are grossly intact    Psychiatric: Pt is alert and oriented to time, place and person.         Assessment/Plan   1. Lumbar spondylosis  Referral to Physical Therapy      2. Chronic low back pain, unspecified back pain laterality, unspecified whether sciatica present  Referral to Physical Therapy      3. Chronic bilateral low back pain with bilateral sciatica  Referral to Physical Therapy          Lumbar spondylosis (721.3) (M47.816)   Chronic low back pain (724.2,338.29) (M54.50,G89.29)    Provider Impressions    1. I have provided the patient with a list of physical therapy exercises to learn and perform to strengthen core, maintain stabilization, and reduce pain. We reviewed the exercises in detail and I encouraged them to perform them on a regular basis.     - The pt was scheduled to start water therapy but reportedly never started this.      2. I would recommend the pt start on Gabapentin  to help with nerve related pain. We discussed the risks, benefits, and side effects to this medication including the mechanism of action and the pt understands and agrees.     3. I extensively reviewed the patients MRI findings (8-3-2023 [facility]) in detail, including review of the actual images and provided a detailed explanation of the findings using a spine model.      There is lumbar spondylosis and disc degeneration at multiple levels.      4. The patient is a candidate for an LESI at L5/S1 , LESI at L2/3 versus  LUMBAR MEDIAL BRANCH L4/5 and L5/S1 bilaterally under fluoroscopic guidance to treat back pain. I spent time with the patient discussing all of the risks, benefits, and alternatives to this measure. Including but not limited to spinal infection, epidural hematoma/abscess, paralysis, nerve injury, steroid effects, and spinal headache. The patient understands and agrees to proceed.     She underwent an LESI at L5/S1 on 5- and prior to that an LESI L3/4 on 2-8-2024 and prior to that he medial branch blocks at L4/5 and L5/S1 last injection at this level was on 12- and 10-6-2023 and she reported 80% relief of her pain for 2 months time. She was able to sit and stand with much less pain.      I spent time with the patient reviewing their imaging and discussing the risks benefits and alternatives to the above plan. A total of 30 minutes was spent reviewing the data and greater than 50% of that time was with the patient during the face to face encounter discussing treatment options both surgical, non-surgical, and minimally invasive techniques.     Mike Matthews MD

## 2024-09-11 ENCOUNTER — OFFICE VISIT (OUTPATIENT)
Dept: NEUROLOGY | Facility: CLINIC | Age: 84
End: 2024-09-11
Payer: MEDICARE

## 2024-09-11 VITALS
WEIGHT: 116.7 LBS | HEIGHT: 64 IN | DIASTOLIC BLOOD PRESSURE: 76 MMHG | SYSTOLIC BLOOD PRESSURE: 124 MMHG | HEART RATE: 65 BPM | BODY MASS INDEX: 19.92 KG/M2

## 2024-09-11 DIAGNOSIS — R41.3 MEMORY LOSS: ICD-10-CM

## 2024-09-11 PROCEDURE — 99212 OFFICE O/P EST SF 10 MIN: CPT | Performed by: PSYCHIATRY & NEUROLOGY

## 2024-09-11 PROCEDURE — 1036F TOBACCO NON-USER: CPT | Performed by: PSYCHIATRY & NEUROLOGY

## 2024-09-11 PROCEDURE — 3074F SYST BP LT 130 MM HG: CPT | Performed by: PSYCHIATRY & NEUROLOGY

## 2024-09-11 PROCEDURE — 1125F AMNT PAIN NOTED PAIN PRSNT: CPT | Performed by: PSYCHIATRY & NEUROLOGY

## 2024-09-11 PROCEDURE — 1159F MED LIST DOCD IN RCRD: CPT | Performed by: PSYCHIATRY & NEUROLOGY

## 2024-09-11 PROCEDURE — 3078F DIAST BP <80 MM HG: CPT | Performed by: PSYCHIATRY & NEUROLOGY

## 2024-09-11 ASSESSMENT — PATIENT HEALTH QUESTIONNAIRE - PHQ9
SUM OF ALL RESPONSES TO PHQ9 QUESTIONS 1 AND 2: 0
1. LITTLE INTEREST OR PLEASURE IN DOING THINGS: NOT AT ALL
2. FEELING DOWN, DEPRESSED OR HOPELESS: NOT AT ALL

## 2024-09-11 ASSESSMENT — PAIN SCALES - GENERAL: PAINLEVEL: 6

## 2024-09-11 NOTE — PROGRESS NOTES
"Subjective   Marysol Hill is a 84 y.o. female who comes in for follow up of mild cognitive impairment.      Memory is stable. She is having problems with her back.   She had 3 injections which did not help her.  They recommended water therapy.    She does have a lot of short term memory however overall she is functioning ok.  She is not driving.     They stopped going to [place].  She misses going there. She goes to [employer] however socially they are more isolated. They are looking into moving into independent living.        Review of Systems    Objective   /76 (BP Location: Right arm, Patient Position: Sitting, BP Cuff Size: Adult)   Pulse 65   Ht 1.626 m (5' 4\")   Wt 52.9 kg (116 lb 11.2 oz)   BMI 20.03 kg/m²   Neurological Exam  Physical Exam        Assessment/Plan   Ms. Hill is a 82-year-old woman presenting to the neurology clinic today for follow up of memory impairment. Her  notices forgetfulness, repetition of facts and events and easy to anger/increased frustration. All these symptoms get worse when she is anxious. Her Neuro-Psych was consistent with mild cognitive impairment and her repeat testing showed progression of her impairment. She is no longer driving.    Will continue her on Aricept to 10 mg daily. Overall things seem stable.      Depression seems stable. Continue anti-depressant.    Follow up in 1 year.    Glenis Medrano, DO  "

## 2024-10-03 ENCOUNTER — EVALUATION (OUTPATIENT)
Dept: PHYSICAL THERAPY | Facility: CLINIC | Age: 84
End: 2024-10-03
Payer: MEDICARE

## 2024-10-03 DIAGNOSIS — G89.29 CHRONIC BILATERAL LOW BACK PAIN WITH BILATERAL SCIATICA: Primary | ICD-10-CM

## 2024-10-03 DIAGNOSIS — M54.42 CHRONIC BILATERAL LOW BACK PAIN WITH BILATERAL SCIATICA: Primary | ICD-10-CM

## 2024-10-03 DIAGNOSIS — M54.41 CHRONIC BILATERAL LOW BACK PAIN WITH BILATERAL SCIATICA: Primary | ICD-10-CM

## 2024-10-03 PROCEDURE — 97161 PT EVAL LOW COMPLEX 20 MIN: CPT | Mod: GP

## 2024-10-03 PROCEDURE — 97110 THERAPEUTIC EXERCISES: CPT | Mod: GP

## 2024-10-03 ASSESSMENT — ENCOUNTER SYMPTOMS
OCCASIONAL FEELINGS OF UNSTEADINESS: 0
DEPRESSION: 0
LOSS OF SENSATION IN FEET: 0

## 2024-10-03 ASSESSMENT — PATIENT HEALTH QUESTIONNAIRE - PHQ9
2. FEELING DOWN, DEPRESSED OR HOPELESS: NOT AT ALL
1. LITTLE INTEREST OR PLEASURE IN DOING THINGS: NOT AT ALL
SUM OF ALL RESPONSES TO PHQ9 QUESTIONS 1 AND 2: 0

## 2024-10-03 ASSESSMENT — COLUMBIA-SUICIDE SEVERITY RATING SCALE - C-SSRS
1. IN THE PAST MONTH, HAVE YOU WISHED YOU WERE DEAD OR WISHED YOU COULD GO TO SLEEP AND NOT WAKE UP?: NO
6. HAVE YOU EVER DONE ANYTHING, STARTED TO DO ANYTHING, OR PREPARED TO DO ANYTHING TO END YOUR LIFE?: NO
2. HAVE YOU ACTUALLY HAD ANY THOUGHTS OF KILLING YOURSELF?: NO

## 2024-10-03 NOTE — PROGRESS NOTES
Patient Name Marysol Hill  MRN: [id number]  Today's Date: 10/03/24  Time Calculation  Start Time: 1123  Stop Time: 1200  Time Calculation (min): 37 min    Insurance:   Visit #: 1  Insurance Reviewed  (per information provided by  pre-cert team)   Authorization required:  N  Approved # of visits: MN  Authorization/MCR certification date range:  10/3/2024 to 2025    Therapy Diagnoses:   Problem List Items Addressed This Visit             ICD-10-CM    Back pain - Primary M54.9    Relevant Orders    Follow Up In Physical Therapy     General:  Reason for visit: Chronic LBP   Referred by: Dr. Cassie Crain MD appt:  [facility]  Preferred Name:  Marysol  Script:  Emmanuelle PT  Onset Date:  2024    Assessment:      Patient with chronic lumbar pain and is having flare up due to OA She needs work on ROM, flexibility, posture, scapular and dynamic stabilization, strength,closed chain activities and soft tissue mobility, for improved overall function.    Problems:    Pain:  _x__  Posture/Body mechanics deficits:  __x_  Decreased knowledge HEP:  _x__  Decreased knowledge of Precautions:  ___  Activity Limitations:   _x__  ADL's/IADL's/Self-care skills:  ___  ROM/Joint Mobility:  _x__  Strength:  _x__  Decreased functional level:   _x__  Flexibility:  _x__  Tenderness/decreased soft tissue mobility:  _x__  Gait/Stairs:  ___  Fall Risk:  ___  Balance:  ___  Edema:  ___  Participation restrictions:  ___  Sensory:  ___  Transfers:  ___  Decreased knowledge of brace:   ___  Other:  ___    X Indicates included in problem list    Goals:      ST weeks  Decrease pain by 1-2 points with VAS scale    Compliant with HEP with assist from spouse    LTG:  by discharge     pain by 3 or more point by the VAS scale and patient I with self-management of symptoms.     Decreased pain and increased function with ADL's and IADL's.  Improve Oswestry to 20 % limitation of function.    Increase trunk AROM to WFL for improved function with  dressing and functioning around the house.      Increase trunk strength and stability to WFL for improved function with ADLs and helping  around the house.      Increase B LE flexibility to WFL.      Decrease B lower quarter tenderness 50-75% per patient report.    I and compliant with HEP and proper back care with spouse's supervision    Rehab potential to achieve the above goals is good.    Patient is aware of diagnosis and prognosis and agrees with established plan of care and goals.    Plan:   Skilled PT 2 x/week for 6 weeks (Until goals achieved, maximum rehab potential has been achieved, or patient has plateaued)  for:    Aquatics  ___x__  CP   ____  Dry needling  ____  Education  ____  Electrical Stimulation  ____  Gait training  ____  HEP  ____  HP  ____  Manual  ____  Mechanical Traction  ____  NMR  ____  Self-care/home management  ____  Therapeutic Exercise   __x__  Therapeutic Activities  ____  US  ____  Work Conditioning  ____  Re-assessment  ____  Other  ____    X Indicates included in treatment plan    Subjective:    HPI: Pt is a previous patient to this clinic for back pain in aquatic environment. Pt stopped PT earlier this year to have 3 injections which patient's spouse reports didn't help    Pain:    Pain 5/10 constantly and goes up occasionally     Type of pain:  achy pain  What increases pain: prolonged sitting, standing, walking  What decreases pain:  changing positions    Medical Hx/  Precautions: memory loss, HTN, Lung disease, Osteoporosis, RA       Adult Screening Risk:      Fall Risk:  low    Patient goal:    Patient and souse are aware of diagnosis and prognosis.    Living Environment:    Social Support: spouse  Lives with: spouse and is her caregiver due to her change in mental status    Prior Function:  Pt can do most of ADLs, but  takes care of house and cooks all meals. Pt's spouse states she is not very active and goes from working on puzzles to laying on  "couch    Function:    A and O x 2  Sleep:  Has a very difficult time getting comfortable  ADL's: indep  Chores: doesn't do many;  takes care of the house  Driving: does not drive  Recreation: limited  Sitting: has pain  Standing:  has worse pain   Walking: has worse pain    Objective:        Outcome Measures:  Other Measures  Oswestry Disablity Index (MENDY): 32     Posture:  decreased lumbar lordosis, slight rounded shoulders    Gait: Pt ambulates independently but prefers to hold spouses arm when outside to prevent any falls    Palpation: tenderness across LB    ROM:  Trunk:  Flexion:  3rd to 4\" from  floor  Extension: 20 deg, with pain  RSB:  3rd to fibular head  LSB:  3rd to fibular head    MMT:   R  L  Flex   4  4   Abd:   4  4  Ext Suspect poor bilaterally; did not get patient in prone position  B LE myotomes: FWL    Flexibility:  Hamstrings:  SLR:  R: 50  L: 50  Hip flexors: mild limitations    Treatment:    Evaluation:  20 minutes  Therapeutic Ex: 17 minutes  Access Code: [email or web address]  URL: [email or web address]  Date: 10/03/2024  Prepared by: Bella Gunter    Exercises  - Seated Lumbar Flexion Stretch  - 2 x daily - 7 x weekly - 1 sets - 10 reps - 5-10 count hold  - Seated Hamstring Stretch  - 2 x daily - 7 x weekly - 1 sets - 3 reps - 20 seconds hold  - Seated Isometric Hip Adduction with Ball  - 2 x daily - 7 x weekly - 1 sets - 10 reps - 5 count hold  - Seated Hip Abduction  - 2 x daily - 7 x weekly - 1 sets - 10 reps - 5 count hold  - Seated March  - 2 x daily - 7 x weekly - 1 sets - 10 reps  Education:  poc, anatomy, physiology, posture, body mechanics, safety awareness, HEP.  Preferred learning:  pictures, demonstration.  Verbalized good understanding.                                        "

## 2024-10-08 ENCOUNTER — TREATMENT (OUTPATIENT)
Dept: PHYSICAL THERAPY | Facility: CLINIC | Age: 84
End: 2024-10-08
Payer: MEDICARE

## 2024-10-08 DIAGNOSIS — G89.29 CHRONIC BILATERAL LOW BACK PAIN WITH BILATERAL SCIATICA: ICD-10-CM

## 2024-10-08 DIAGNOSIS — M54.42 CHRONIC BILATERAL LOW BACK PAIN WITH BILATERAL SCIATICA: ICD-10-CM

## 2024-10-08 DIAGNOSIS — M54.41 CHRONIC BILATERAL LOW BACK PAIN WITH BILATERAL SCIATICA: ICD-10-CM

## 2024-10-08 PROCEDURE — 97113 AQUATIC THERAPY/EXERCISES: CPT | Mod: GP,CQ

## 2024-10-08 NOTE — PROGRESS NOTES
l Therapy Progress Note    Patient Name Marysol Hill   MRN: [id number]  Today's Date: 10/8/2024  Time Calculation  Start Time: 1157  Stop Time: 1220  Time Calculation (min): 23 min    Insurance:    Visit #:   2   Insurance Reviewed  (per information provided by  pre-cert team)   Authorization required:  N  Approved # of visits: MN  Authorization/MCR certification date range:  10/3/2024 to 1/1/2025     Therapy Diagnoses:   Problem List Items Addressed This Visit             ICD-10-CM    Back pain M54.9       General:  Reason for visit: Chronic LBP   Referred by: Dr. Cassie Crain MD appt:  PRN  Preferred Name:  Marysol  Script:  [facility]  Onset Date:  8/26/2024    Subjective:   Patient reports: her back feels better in the water.     Have you fallen since last visit:  no    Medical Hx/  Precautions: memory loss, HTN, Lung disease, Osteoporosis, RA     Adult Screening Risk:       Fall Risk:  low    Pain:  5/10  Location/Type of pain:  lumbar/ continuously achy  What increases pain: prolonged sitting, standing, walking  What decreases pain: getting in the pool    HEP compliance/understanding:  building AQUA HEP    Objective:   Objective Measurements:    Posture:  decreased lumbar lordosis, slight rounded shoulders   Gait: Patient ambulated without assistive device with step through gait on pool deck with [place]  Stairs: Ascends/descends stairs w/ step to gait using bilateral hand rails w/ [place] until last step and then required bilateral HHA due to fear    Balance: Fair  Patient required intermittent mod A on TM for safety. Will use wall instead for gait training NV.   Patient required in water assist for safety.     Treatment:   **= HEP progression today NV= Next visit  np= not performed  nb= non-billable  G= group HEP= discharged to HEP  Therapeutic Exercise:       minutes      Manual Therapy:       minutes      Neuromuscular Re-education:      minutes      Gait Training:            minutes      Aquatics:           23 minutes  *Sinker or Floater**  **Plans to go to [facility] for AQUA HEP**    C=Current, BTW = Back to Wall, NV = Next Visit, NP = Not Performed, G = Group, NB = non-billable     TM FWD T= 0 C= 0 intermittent min A 2 minutes  Forward/Retro ambulation at wall NV  Lateral ambulation at wall NV    HR/TR in TM bars x 10  Marching in TM bars x 10  3 way hip AROM in TM bars  w/ min A to BLE for control against buoyancy x 10 each   Partial Squats NV    BTW abdominal draw in NV  BTW DLS open board push/pull, up/down NV  BTW Can Cans NV  BTW Dynamic Hamstring Stretch NV  BTW DLS BUE open paddles flex/ext, ABD/ADD symmetrical/reciprocal NV  BTW DLS open paddles horizontal ADD/ABD NV  BTW SKTC x 10 NV  BTW Piriformis stretch NV    Deep End with noodle w/ mod A (patient became fearful and lost control against buoyancy with transition into deep end):  Cycle 3 minutes  Cross Country/Scissors NV  Hang w/ min A for control against buoyancy 3 minutes    Stair Stretches: BLE hamstrings/hip flexors/calves         Education:  Benefits of Aquatics including buoyancy, resistance ,unloading and hydrostatic pressure postural awareness, pacing in pain free ROM, hydration and rest post initial treatment, balance strategies    Assessment:  Patient tolerated treatment well, did well with progression this date.    Patient needs continued work on/skilled PT for:  ROM, flexibility, posture, scapular and dynamic stabilization, strength,closed chain activities and soft tissue mobility,  to address remaining functional, objective and subjective deficits to allow them to return to prior /optimal level of function with ADLs.  Patient is progressing with goals: no progress towards goals this visit due to cognition.  Skilled care:  Initiation of Aquatics, patient education    Plan:    Continue to progress per poc:   NV add DLS resistive drag strengthening using open board and open paddles per tolerance.     HEP:  building AQUA HEP

## 2024-10-10 ENCOUNTER — TREATMENT (OUTPATIENT)
Dept: PHYSICAL THERAPY | Facility: CLINIC | Age: 84
End: 2024-10-10
Payer: MEDICARE

## 2024-10-10 DIAGNOSIS — G89.29 CHRONIC BILATERAL LOW BACK PAIN WITH BILATERAL SCIATICA: ICD-10-CM

## 2024-10-10 DIAGNOSIS — M54.41 CHRONIC BILATERAL LOW BACK PAIN WITH BILATERAL SCIATICA: ICD-10-CM

## 2024-10-10 DIAGNOSIS — M54.42 CHRONIC BILATERAL LOW BACK PAIN WITH BILATERAL SCIATICA: ICD-10-CM

## 2024-10-10 PROCEDURE — 97113 AQUATIC THERAPY/EXERCISES: CPT | Mod: GP,CQ

## 2024-10-10 NOTE — PROGRESS NOTES
l Therapy Progress Note    Patient Name Marysol Hill   MRN: [id number]  Today's Date: 10/10/2024  Time Calculation  Start Time: 0916  Stop Time: 1000  Time Calculation (min): 44 min    Insurance:    Visit #:   3   Insurance Reviewed  (per information provided by  pre-cert team)   Authorization required:  N  Approved # of visits: MN  Authorization/MCR certification date range:  10/3/2024 to 1/1/2025     Therapy Diagnoses:   Problem List Items Addressed This Visit             ICD-10-CM    Back pain M54.9       General:  Reason for visit: Chronic LBP   Referred by: Dr. Cassie Crain MD appt:  PRN  Preferred Name:  Marysol  Script:  [facility]  Onset Date:  8/26/2024    Subjective:   Patient reports her pain is constant and moderate. Can't think of any other description.    Have you fallen since last visit:  no    Medical Hx/  Precautions: memory loss, HTN, Lung disease, Osteoporosis, RA     Adult Screening Risk:       Fall Risk:  low    Pain:  5/10  Location/Type of pain:  lumbar constant  What increases pain: prolonged sitting, standing, walking  What decreases pain: getting in the pool    HEP compliance/understanding:  building AQUA HEP    Objective:   Objective Measurements:    Posture:  decreased lumbar lordosis, slight rounded shoulders   Gait: Patient ambulated without assistive device with step through gait on pool deck with [place]  Stairs: Ascends/descends stairs w/ step to gait using bilateral hand rails w/ [place] until last step and then required bilateral HHA due to fear    Balance: Fair  Patient required intermittent mod A on TM for safety. Will use wall instead for gait training NV.       Treatment:   **= HEP progression today NV= Next visit  np= not performed  nb= non-billable  G= group HEP= discharged to [facility]  Therapeutic Exercise:       minutes      Manual Therapy:       minutes      Neuromuscular Re-education:      minutes      Gait Training:            minutes      Aquatics:          44  "minutes  *Sinker or Floater**  **Plans to go to [facility] for AQUA HEP**    C=Current, BTW = Back to Wall, NV = Next Visit, NP = Not Performed, G = Group, NB = non-billable     TM FWD T= 0 C= 0 intermittent min A 2 minutes  Forward/Retro ambulation w/ wall and barbell support at small side wall near steps 8 laps each direction  Lateral ambulation at wall at small side of wall of wall near steps w/ noodles as safety visual aide 6 laps each    HR/TR at wall x 10  Marching at wall  x 10  3 way hip AROM at wall w/ verbal cues for BLE control against buoyancy x 10 each   Partial Squats x 5 - very difficult to coordinate and could not perform properly    BTW abdominal draw in 5\" x 10  BTW DLS open board push/pull, up/down x 10  BTW Can Cans  attempted, but could not coordinate  BTW Dynamic Hamstring Stretch x 10 symmetrical alanna  BTW DLS BUE open paddles flex/ext, ABD/ADD symmetrical/reciprocal x 10 each  BTW DLS open paddles horizontal ADD/ABD NV  BTW SKTC x 10 NV  BTW Piriformis stretch NV    Deep End with noodle NV  Cycle 3 minutes NV  Cross Country/Scissors NV  Hang w/ min A for control against buoyancy 3 minutes NV    Stair Stretches: BLE hamstrings/hip flexors/calves 30\" x 3 - very challenging to maintain static stretch due to cognition        Education:  Benefits of Aquatics including buoyancy, resistance ,unloading and hydrostatic pressure postural awareness, pacing in pain free ROM, hydration and rest post initial treatment, balance strategies    Assessment:  Patient tolerated treatment fair, did fair with modified treatment at [place], near steps this visit. In water assist was not possible today, so modified session took place. Patient had difficulty with coordination with partial squats, can cans and step ups due to cognition. Stepped into deep end the first lap of forward walking, requiring min A from deck to recover back to 4 feet water depth, but improved safety with ambulation when noodles were used as a visual cue " for safe distance.   Patient needs continued work on/skilled PT for:  ROM, flexibility, posture, scapular and dynamic stabilization, strength,closed chain activities and soft tissue mobility,  to address remaining functional, objective and subjective deficits to allow them to return to prior /optimal level of function with ADLs.  Patient is progressing  towards goals: of improved BLE control against buoyancy with verbal cueing and repetition of exercises. Improved postural awareness with verbal cueing for visual fixation.  Skilled care: Aquatics, patient education    Plan:    Continue to progress per poc:   NV re-introduce deep end exercises and move away from wall per [place] and as in water assist is available. Add SKTC NV.    HEP:  building AQUA HEP

## 2024-10-15 ENCOUNTER — TREATMENT (OUTPATIENT)
Dept: PHYSICAL THERAPY | Facility: CLINIC | Age: 84
End: 2024-10-15
Payer: MEDICARE

## 2024-10-15 DIAGNOSIS — M54.41 CHRONIC BILATERAL LOW BACK PAIN WITH BILATERAL SCIATICA: ICD-10-CM

## 2024-10-15 DIAGNOSIS — G89.29 CHRONIC BILATERAL LOW BACK PAIN WITH BILATERAL SCIATICA: ICD-10-CM

## 2024-10-15 DIAGNOSIS — M54.42 CHRONIC BILATERAL LOW BACK PAIN WITH BILATERAL SCIATICA: ICD-10-CM

## 2024-10-15 PROCEDURE — 97113 AQUATIC THERAPY/EXERCISES: CPT | Mod: GP,CQ

## 2024-10-15 NOTE — PROGRESS NOTES
l Therapy Progress Note    Patient Name Marysol Hill   MRN: [id number]  Today's Date: 10/15/2024  Time Calculation  Start Time: 1131  Stop Time: 1209  Time Calculation (min): 38 min    Insurance:    Visit #:   4   Insurance Reviewed  (per information provided by  pre-cert team)   Authorization required:  N  Approved # of visits: MN  Authorization/MCR certification date range:  10/3/2024 to 1/1/2025     Therapy Diagnoses:   Problem List Items Addressed This Visit             ICD-10-CM    Back pain M54.9       General:  Reason for visit: Chronic LBP   Referred by: Dr. Cassie Crain MD appt:  PRN  Preferred Name:  Marysol  Script:  [facility]  Onset Date:  8/26/2024    Subjective:   Patient reports she is achy today, but feels better in the pool.     Have you fallen since last visit:  no    Medical Hx/  Precautions: memory loss, HTN, Lung disease, Osteoporosis, RA     Adult Screening Risk:       Fall Risk:  low    Pain:  5/10  Location/Type of pain:  lumbar constant ache  What increases pain: prolonged sitting, standing, walking  What decreases pain: getting in the pool    HEP compliance/understanding:  building AQUA HEP    Objective:   Objective Measurements:    Posture:  decreased lumbar lordosis, slight rounded shoulders   Gait: Patient ambulated without assistive device with step through gait on pool deck with [place]  Stairs: Ascends/descends stairs w/ step to gait using bilateral hand rails w/ [place] until last step and then required bilateral HHA due to fear    Balance: Fair      Treatment:   **= HEP progression today NV= Next visit  np= not performed  nb= non-billable  G= group HEP= discharged to [facility]  Therapeutic Exercise:       minutes      Manual Therapy:       minutes      Neuromuscular Re-education:      minutes      Gait Training:            minutes      Aquatics:          38 minutes  In Water Assist  *Sinker or Floater**  **Plans to go to [facility] for AQUA HEP**    C=Current, BTW = Back to Wall, NV =  "Next Visit, NP = Not Performed, G = Group, NB = non-billable     TM FWD T= 0 C= 0 intermittent min A 2 minutes NV  Forward/Retro ambulation w/ HHA 6 laps each  Lateral ambulation at wall 4 laps    HR/TR at wall x 10  Marching at wall  x 10  3 way hip AROM at wall w/ verbal cues for BLE control against buoyancy x 10 each   Partial Squats in TM bars 2x10    BTW abdominal draw in 5\" x 10  BTW DLS open board push/pull, up/down x 10  BTW Can Cans  x 10 w/ tactile cues   BTW Dynamic Hamstring Stretch x 10 symmetrical alanna  BTW DLS BUE open paddles flex/ext, ABD/ADD symmetrical/reciprocal x 10 each  BTW DLS open paddles horizontal ADD/ABD NV  BTW SKTC x 10 NV  BTW Piriformis stretch NV    Deep End with noodle NV  Cycle 3 minutes NV  Cross Country/Scissors NV  Hang w/ min A for control against buoyancy 3 minutes NV    Stair Stretches: BLE hamstrings/hip flexors/calves 30\" x 3        Education:  BLE control against buoyancy,  postural awareness, pacing in pain free ROM, balance strategies, gait training     Assessment:  Patient tolerated treatment well, with moderate fatigue and decreased pain. Improved gait in 4 feet water depth with HHA.   Patient needs continued work on/skilled PT for:  ROM, flexibility, posture, scapular and dynamic stabilization, strength,closed chain activities and soft tissue mobility, to address remaining functional, objective and subjective deficits to allow them to return to prior /optimal level of function with ADLs.  Patient is progressing  towards goals: of improved BLE control against buoyancy with verbal cueing and repetition of exercises. Improved BLE control against buoyancy with tactile cueing.   Skilled care: Aquatics, patient education    Plan:    Continue to progress per poc:   NV add SKTC NV.    HEP:  building AQUA HEP    "

## 2024-10-17 ENCOUNTER — LAB (OUTPATIENT)
Dept: LAB | Facility: LAB | Age: 84
End: 2024-10-17
Payer: COMMERCIAL

## 2024-10-17 ENCOUNTER — TREATMENT (OUTPATIENT)
Dept: PHYSICAL THERAPY | Facility: CLINIC | Age: 84
End: 2024-10-17
Payer: MEDICARE

## 2024-10-17 DIAGNOSIS — D47.2 MONOCLONAL GAMMOPATHY: Primary | ICD-10-CM

## 2024-10-17 DIAGNOSIS — G89.29 CHRONIC BILATERAL LOW BACK PAIN WITH BILATERAL SCIATICA: ICD-10-CM

## 2024-10-17 DIAGNOSIS — M54.42 CHRONIC BILATERAL LOW BACK PAIN WITH BILATERAL SCIATICA: ICD-10-CM

## 2024-10-17 DIAGNOSIS — M54.41 CHRONIC BILATERAL LOW BACK PAIN WITH BILATERAL SCIATICA: ICD-10-CM

## 2024-10-17 DIAGNOSIS — E78.5 HYPERLIPIDEMIA, UNSPECIFIED: ICD-10-CM

## 2024-10-17 LAB
ALBUMIN SERPL BCP-MCNC: 3.8 G/DL (ref 3.4–5)
ALP SERPL-CCNC: 37 U/L (ref 33–136)
ALT SERPL W P-5'-P-CCNC: 19 U/L (ref 7–45)
ANION GAP SERPL CALC-SCNC: 13 MMOL/L (ref 10–20)
AST SERPL W P-5'-P-CCNC: 24 U/L (ref 9–39)
BASOPHILS # BLD AUTO: 0.02 X10*3/UL (ref 0–0.1)
BASOPHILS NFR BLD AUTO: 0.3 %
BILIRUB SERPL-MCNC: 0.4 MG/DL (ref 0–1.2)
BUN SERPL-MCNC: 12 MG/DL (ref 6–23)
CALCIUM SERPL-MCNC: 9.4 MG/DL (ref 8.6–10.6)
CHLORIDE SERPL-SCNC: 105 MMOL/L (ref 98–107)
CHOLEST SERPL-MCNC: 131 MG/DL (ref 0–199)
CHOLESTEROL/HDL RATIO: 2.4
CO2 SERPL-SCNC: 31 MMOL/L (ref 21–32)
CREAT SERPL-MCNC: 0.69 MG/DL (ref 0.5–1.05)
EGFRCR SERPLBLD CKD-EPI 2021: 86 ML/MIN/1.73M*2
EOSINOPHIL # BLD AUTO: 0.24 X10*3/UL (ref 0–0.4)
EOSINOPHIL NFR BLD AUTO: 4.1 %
ERYTHROCYTE [DISTWIDTH] IN BLOOD BY AUTOMATED COUNT: 12.7 % (ref 11.5–14.5)
GLUCOSE SERPL-MCNC: 95 MG/DL (ref 74–99)
HCT VFR BLD AUTO: 43.2 % (ref 36–46)
HDLC SERPL-MCNC: 55.7 MG/DL
HGB BLD-MCNC: 13.4 G/DL (ref 12–16)
IMM GRANULOCYTES # BLD AUTO: 0.01 X10*3/UL (ref 0–0.5)
IMM GRANULOCYTES NFR BLD AUTO: 0.2 % (ref 0–0.9)
LDLC SERPL CALC-MCNC: 57 MG/DL
LYMPHOCYTES # BLD AUTO: 1.55 X10*3/UL (ref 0.8–3)
LYMPHOCYTES NFR BLD AUTO: 26.8 %
MCH RBC QN AUTO: 32 PG (ref 26–34)
MCHC RBC AUTO-ENTMCNC: 31 G/DL (ref 32–36)
MCV RBC AUTO: 103 FL (ref 80–100)
MONOCYTES # BLD AUTO: 0.5 X10*3/UL (ref 0.05–0.8)
MONOCYTES NFR BLD AUTO: 8.6 %
NEUTROPHILS # BLD AUTO: 3.47 X10*3/UL (ref 1.6–5.5)
NEUTROPHILS NFR BLD AUTO: 60 %
NON HDL CHOLESTEROL: 75 MG/DL (ref 0–149)
NRBC BLD-RTO: 0 /100 WBCS (ref 0–0)
PLATELET # BLD AUTO: 188 X10*3/UL (ref 150–450)
POTASSIUM SERPL-SCNC: 4.6 MMOL/L (ref 3.5–5.3)
PROT SERPL-MCNC: 6 G/DL (ref 6.4–8.2)
PROT SERPL-MCNC: 6 G/DL (ref 6.4–8.2)
RBC # BLD AUTO: 4.19 X10*6/UL (ref 4–5.2)
SODIUM SERPL-SCNC: 144 MMOL/L (ref 136–145)
TRIGL SERPL-MCNC: 92 MG/DL (ref 0–149)
TSH SERPL-ACNC: 2.77 MIU/L (ref 0.44–3.98)
VLDL: 18 MG/DL (ref 0–40)
WBC # BLD AUTO: 5.8 X10*3/UL (ref 4.4–11.3)

## 2024-10-17 PROCEDURE — 36415 COLL VENOUS BLD VENIPUNCTURE: CPT

## 2024-10-17 PROCEDURE — 84155 ASSAY OF PROTEIN SERUM: CPT

## 2024-10-17 PROCEDURE — 86334 IMMUNOFIX E-PHORESIS SERUM: CPT

## 2024-10-17 PROCEDURE — 85025 COMPLETE CBC W/AUTO DIFF WBC: CPT

## 2024-10-17 PROCEDURE — 84165 PROTEIN E-PHORESIS SERUM: CPT

## 2024-10-17 PROCEDURE — 80061 LIPID PANEL: CPT

## 2024-10-17 PROCEDURE — 84443 ASSAY THYROID STIM HORMONE: CPT

## 2024-10-17 PROCEDURE — 97113 AQUATIC THERAPY/EXERCISES: CPT | Mod: GP,CQ

## 2024-10-17 PROCEDURE — 80053 COMPREHEN METABOLIC PANEL: CPT

## 2024-10-17 NOTE — PROGRESS NOTES
l Therapy Progress Note    Patient Name Marysol Hill   MRN: [id number]  Today's Date: 10/17/2024  Time Calculation  Start Time: 1135  Stop Time: 1215  Time Calculation (min): 40 min    Insurance:    Visit #:   5   Insurance Reviewed  (per information provided by  pre-cert team)   Authorization required:  N  Approved # of visits: MN  Authorization/MCR certification date range:  10/3/2024 to 1/1/2025     Therapy Diagnoses:   Problem List Items Addressed This Visit             ICD-10-CM    Back pain M54.9       General:  Reason for visit: Chronic LBP   Referred by: Dr. Cassie Crain MD appt:  PRN  Preferred Name:  Marysol  Script:  [facility]  Onset Date:  8/26/2024    Subjective:   Patient reports her lower back is continuously sore.     Have you fallen since last visit:  no    Medical Hx/  Precautions: memory loss, HTN, Lung disease, Osteoporosis, RA     Adult Screening Risk:       Fall Risk:  low    Pain: Could not give a number today  Location/Type of pain:  lumbar constant ache  What increases pain: prolonged sitting, standing, walking  What decreases pain: getting in the pool, resting    HEP compliance/understanding:  building AQUA HEP    Objective:   Objective Measurements:    Posture:  decreased lumbar lordosis, slight rounded shoulders   Gait: Patient ambulated without assistive device with step through gait on pool deck with [place]  Stairs: Ascends/descends stairs w/ step to gait using bilateral hand rails w/ [place] until last step and then required bilateral HHA due to fear    Balance: Fair      Treatment:   **= HEP progression today NV= Next visit  np= not performed  nb= non-billable  G= group HEP= discharged to [facility]  Therapeutic Exercise:       minutes      Manual Therapy:       minutes      Neuromuscular Re-education:      minutes      Gait Training:            minutes      Aquatics:          40 minutes  In Water Assist  *Sinker or Floater**  **Plans to go to [facility] for AQUA HEP**    C=Current, BTW =  "Back to Wall, NV = Next Visit, NP = Not Performed, G = Group, NB = non-billable     Forward/Retro ambulation w/ HHA 6 laps each  Lateral ambulation at wall 4 laps    HR/TR  w/ barbell w/ min A x 10  Marching w/ barbell w/ CGA  x 10  3 way hip AROM w/ wall and barbell support w/ tactile cues for BLE control against buoyancy x 10 each   Partial Squats at wall x 10  Hip Circumduction w/ wall and barbell support CW/CCW x 10 each alanna    BTW abdominal draw in 5\" x 10  BTW DLS open board push/pull, up/down x 10  BTW Can Cans  x 10 w/ tactile cues   BTW Dynamic Hamstring Stretch x 10 symmetrical alanna  BTW DLS BUE open paddles flex/ext, ABD/ADD symmetrical/reciprocal x 10 each  BTW DLS open paddles horizontal ADD/ABD NV  BTW SKTC w/ CGA x 10   BTW Piriformis stretch w/ CGA 20\" x 3 alanna     Deep End with noodle - intermittent tacite cues for BLE control against buoyancy and verbal cues to hold on during all deep end exercises  Cycle 3 minutes   Cross Country/Scissors 1 minute each  Hang w/ min A for control against buoyancy 3 minutes     Stair Stretches: BLE hamstrings/hip flexors/calves 30\" x 3        Education:  BLE control against buoyancy,  postural awareness, pacing in pain free ROM, balance strategies, gait training     Assessment:  Patient tolerated treatment well, with moderate fatigue and decreased pain. Required CGA for progression of stretches at [place] this visit. Required frequent verbal cueing to maintain hold of band in deep end and tactile cueing for BLE control against buoyancy throughout deep end exercises.   Patient needs continued work on/skilled PT for:  ROM, flexibility, posture, scapular and dynamic stabilization, strength,closed chain activities and soft tissue mobility, to address remaining functional, objective and subjective deficits to allow them to return to prior /optimal level of function with ADLs.  Patient is progressing  towards goals: of improved BLE control against buoyancy with verbal cueing " and repetition of exercises. Improved BLE control against buoyancy with tactile cueing.   Skilled care: Aquatics, patient education    Plan:    Continue to progress per poc:   NV add Jaye Vo.    HEP:  building AQUA HEP

## 2024-10-22 ENCOUNTER — TREATMENT (OUTPATIENT)
Dept: PHYSICAL THERAPY | Facility: CLINIC | Age: 84
End: 2024-10-22
Payer: MEDICARE

## 2024-10-22 DIAGNOSIS — M54.41 CHRONIC BILATERAL LOW BACK PAIN WITH BILATERAL SCIATICA: ICD-10-CM

## 2024-10-22 DIAGNOSIS — M54.42 CHRONIC BILATERAL LOW BACK PAIN WITH BILATERAL SCIATICA: ICD-10-CM

## 2024-10-22 DIAGNOSIS — G89.29 CHRONIC BILATERAL LOW BACK PAIN WITH BILATERAL SCIATICA: ICD-10-CM

## 2024-10-22 LAB
ALBUMIN: 3.6 G/DL (ref 3.4–5)
ALPHA 1 GLOBULIN: 0.3 G/DL (ref 0.2–0.6)
ALPHA 2 GLOBULIN: 0.7 G/DL (ref 0.4–1.1)
BETA GLOBULIN: 0.7 G/DL (ref 0.5–1.2)
GAMMA GLOBULIN: 0.6 G/DL (ref 0.5–1.4)
IMMUNOFIXATION COMMENT: ABNORMAL
M-PROTEIN 1: 0.1 G/DL
PATH REVIEW - SERUM IMMUNOFIXATION: ABNORMAL
PATH REVIEW-SERUM PROTEIN ELECTROPHORESIS: ABNORMAL
PROTEIN ELECTROPHORESIS COMMENT: ABNORMAL

## 2024-10-22 PROCEDURE — 97113 AQUATIC THERAPY/EXERCISES: CPT | Mod: GP,CQ

## 2024-10-22 NOTE — PROGRESS NOTES
"l Therapy Progress Note    Patient Name Marysol Hill   MRN: [id number]  Today's Date: 10/22/2024  Time Calculation  Start Time: 1100  Stop Time: 1144  Time Calculation (min): 44 min    Insurance:    Visit #:   6   Insurance Reviewed  (per information provided by  pre-cert team)   Authorization required:  N  Approved # of visits: MN  Authorization/MCR certification date range:  10/3/2024 to 1/1/2025     Therapy Diagnoses:   Problem List Items Addressed This Visit             ICD-10-CM    Back pain M54.9       General:  Reason for visit: Chronic LBP   Referred by: Dr. Cassie Crain MD appt:  PRN  Preferred Name:  Marysol  Script:  [facility]  Onset Date:  8/26/2024    Subjective:   Patient reports her back is feeling a bit better and wants to go to the [facility] with her  next week.     Have you fallen since last visit:  no    Medical Hx/  Precautions: memory loss, HTN, Lung disease, Osteoporosis, RA     Adult Screening Risk:       Fall Risk:  low    Pain: Could not give a number \"low\"  Location/Type of pain:  lumbar constant ache  What increases pain: prolonged sitting, standing, walking  What decreases pain: getting in the pool, resting    HEP compliance/understanding:  building AQUA HEP    Objective:   Objective Measurements:    Posture:  decreased lumbar lordosis, slight rounded shoulders   Gait: Patient ambulated without assistive device with step through gait on pool deck with [place]  Stairs: Ascends/descends stairs w/ step to gait using bilateral hand rails w/ [place] until last step and then required bilateral HHA due to fear    Balance: Fair      Treatment:   **= HEP progression today NV= Next visit  np= not performed  nb= non-billable  G= group HEP= discharged to [facility]    Aquatics:          44 minutes  Trialed Deck Treatment this visit   *Sinker or Floater**  **Plans to go to [facility] for AQUA HEP**    C=Current, BTW = Back to Wall, NV = Next Visit, NP = Not Performed, G = Group, NB = non-billable " "    Forward/Retro ambulation w/ HHA 6 laps each  Lateral ambulation at wall 4 laps    HR/TR  in TM bars 2 x 10  Marching in TM bars 2  x 10  3 way hip AROM w/ wall and barbell support w/ tactile cues for BLE control against buoyancy 2  x 10 each   Partial Squats at wall 2 x 10  Hip Circumduction w/ wall and barbell support CW/CCW 2 x 10 each alanna    BTW abdominal draw in 5\" x 10  BTW DLS open board push/pull, up/down 2 x 10  BTW Can Cans  2 x 10  BTW Dynamic Hamstring Stretch 2 x 10 symmetrical alanna  BTW DLS BUE open paddles flex/ext, ABD/ADD symmetrical/reciprocal 2 x 10 each  BTW DLS open paddles horizontal ADD/ABD N2 x 10  BTW SKTC w/ CGA x 10  NV  BTW Piriformis stretch w/ CGA 20\" x 3 alanna  NV    Deep End with noodle - intermittent tacite cues for BLE control against buoyancy and verbal cues to hold on during all deep end exercises  Cycle 3 minutes  NV  Cross Country/Scissors 1 minute each NV  Hang w/ min A for control against buoyancy 3 minutes  NV    Stair Stretches: BLE hamstrings/hip flexors/calves 30\" x 3  Step Ups x 10 each alanna lead LE w/ BUE support x 5 each        Education:  BLE control against buoyancy,  postural awareness, pacing in pain free ROM, balance strategies, gait training, pt instructed not to go to [facility] yet - requires further tactile and verbal cueing for safe AROM with avoidance of compensation     Assessment:  Patient tolerated treatment fair, requiring frequent redirection, verbal cueing, tactile cueing from deck  and redirection for proper execution of aquatic exercises.   Patient needs continued work on/skilled PT for:  ROM, flexibility, posture, scapular and dynamic stabilization, strength,closed chain activities and soft tissue mobility, to address remaining functional, objective and subjective deficits to allow them to return to prior /optimal level of function with ADLs.  Patient is progressing  towards goals: of improved BLE control against buoyancy with verbal cueing and repetition of " exercises. Improved BLE flexibility with stiar stretches this visit  Skilled care: Aquatics, patient education    Plan:    Continue to progress per poc:   NV add Jaye SHELLEY.    HEP:  building AQUA HEP

## 2024-10-24 ENCOUNTER — TREATMENT (OUTPATIENT)
Dept: PHYSICAL THERAPY | Facility: CLINIC | Age: 84
End: 2024-10-24
Payer: MEDICARE

## 2024-10-24 DIAGNOSIS — M54.42 CHRONIC BILATERAL LOW BACK PAIN WITH BILATERAL SCIATICA: ICD-10-CM

## 2024-10-24 DIAGNOSIS — G89.29 CHRONIC BILATERAL LOW BACK PAIN WITH BILATERAL SCIATICA: ICD-10-CM

## 2024-10-24 DIAGNOSIS — M54.41 CHRONIC BILATERAL LOW BACK PAIN WITH BILATERAL SCIATICA: ICD-10-CM

## 2024-10-24 PROCEDURE — 97113 AQUATIC THERAPY/EXERCISES: CPT | Mod: GP,CQ

## 2024-10-24 NOTE — PROGRESS NOTES
"l Therapy Progress Note    Patient Name Marysol Hill   MRN: [id number]  Today's Date: 10/24/2024  Time Calculation  Start Time: 0915  Stop Time: 1000  Time Calculation (min): 45 min    Insurance:    Visit #:   7   Insurance Reviewed  (per information provided by  pre-cert team)   Authorization required:  N  Approved # of visits: MN  Authorization/MCR certification date range:  10/3/2024 to 1/1/2025     Therapy Diagnoses:   Problem List Items Addressed This Visit             ICD-10-CM    Back pain M54.9       General:  Reason for visit: Chronic LBP   Referred by: Dr. Cassie Crain MD appt:  PRN  Preferred Name:  Marysol  Script:  [facility]  Onset Date:  8/26/2024    Subjective:   Patient reports her back pain is moderate. Feeling ok this morning.     Have you fallen since last visit:  no    Medical Hx/  Precautions: memory loss, HTN, Lung disease, Osteoporosis, RA     Adult Screening Risk:       Fall Risk:  low    Pain: Could not give a number \"low\"  Location/Type of pain:  lumbar constant ache  What increases pain: prolonged sitting, standing, walking, moving  What decreases pain: getting in the pool, resting    HEP compliance/understanding:  building AQUA HEP    Objective:   Objective Measurements:    Posture:  decreased lumbar lordosis, slight rounded shoulders   Gait: Patient ambulated without assistive device with step through gait on pool deck with [place]  Stairs: Ascends/descends stairs w/ step to gait using bilateral hand rails w/ [place] until last step and then required bilateral HHA due to fear    Balance: Fair  In Water assist provided      Treatment:   **= HEP progression today NV= Next visit  np= not performed  nb= non-billable  G= group HEP= discharged to [facility]    Aquatics:          45 minutes  Trialed Deck Treatment this visit   *Floater**  **Plans to go to [facility] for AQUA HEP**  **IN Water Assist**  C=Current, BTW = Back to Wall, NV = Next Visit, NP = Not Performed, G = Group, NB = non-billable " "    Forward/Retro ambulation w/ barbell support w/ CGA 6 laps each  Lateral ambulation w/ barbell support w/ min A at trunk x 4 laps each direction 4 laps    HR/TR  w/ barbell w/ mod A  2 x 10  Marching w/ barbel lw/ CGA 2  x 10  3 way hip AROM w/ wall and barbell support w/ tactile cues for BLE control against buoyancy 2  x 10 each   Partial Squats at wall 2 x 10 tactile, verbal cueing and demonstration for proper technique  Hip Circumduction w/ wall and barbell support CW/CCW 2 x 10 each alanna    BTW abdominal draw in 5\" x 10  BTW DLS open board push/pull, up/down 2 x 10  BTW Can Cans  2 x 10  BTW Dynamic Hamstring Stretch 2 x 10 symmetrical alanna  BTW DLS BUE open paddles flex/ext, ABD/ADD symmetrical/reciprocal 2 x 10 each  BTW DLS open paddles horizontal ADD/ABD N2 x 10  BTW SKTC w/ CGA 10\" x 10 alanna    BTW Piriformis stretch w/ CGA 20\" x 3 alanna     Chi NE #1-#6 3 diaphragmatic breaths each     Deep End with noodle - intermittent tacite cues for BLE control against buoyancy and verbal cues to hold on during all deep end exercises  Cycle 3 minutes  NV  Cross Country/Scissors 1 minute each NV  Hang w/ min A for control against buoyancy 3 minutes  NV    Stair Stretches: BLE hamstrings/hip flexors/calves 30\" x 3  Step Ups x 10 each alanna lead LE w/ BUE support x 10 each        Education:  BLE control against buoyancy,  postural awareness, pacing in pain free ROM, balance strategies, gait training    Assessment:  Patient tolerated treatment fair, requiring frequent redirection, verbal cueing, tactile cueing from deck  and redirection for proper execution of aquatic exercises. Required CGA - min A for trunk stabilization. Patient had difficulty with coordination of partial squats and HR/TR. Perofrmed progression of  Chi Ne with good tolerance and improved coordination and balance with repetition and verbal cueing, along with mirroring.   Patient needs continued work on/skilled PT for:  ROM, flexibility, posture, scapular " and dynamic stabilization, strength,closed chain activities and soft tissue mobility, to address remaining functional, objective and subjective deficits to allow them to return to prior /optimal level of function with ADLs.  Patient is progressing  towards goals: of improved BLE control against buoyancy, improved TA strength with resistive drag exercises  Skilled care: Aquatics, patient education    Plan:    Continue to progress per poc:   NV progress Jaye SHELLEY.    HEP:  building AQUA HEP

## 2024-10-29 ENCOUNTER — APPOINTMENT (OUTPATIENT)
Dept: PHYSICAL THERAPY | Facility: CLINIC | Age: 84
End: 2024-10-29
Payer: MEDICARE

## 2024-10-29 DIAGNOSIS — M54.41 CHRONIC BILATERAL LOW BACK PAIN WITH BILATERAL SCIATICA: ICD-10-CM

## 2024-10-29 DIAGNOSIS — M54.42 CHRONIC BILATERAL LOW BACK PAIN WITH BILATERAL SCIATICA: ICD-10-CM

## 2024-10-29 DIAGNOSIS — G89.29 CHRONIC BILATERAL LOW BACK PAIN WITH BILATERAL SCIATICA: ICD-10-CM

## 2024-10-29 PROCEDURE — 97113 AQUATIC THERAPY/EXERCISES: CPT | Mod: GP,CQ

## 2024-10-30 ENCOUNTER — APPOINTMENT (OUTPATIENT)
Dept: PHYSICAL THERAPY | Facility: CLINIC | Age: 84
End: 2024-10-30
Payer: MEDICARE

## 2024-11-01 ENCOUNTER — APPOINTMENT (OUTPATIENT)
Dept: PHYSICAL THERAPY | Facility: CLINIC | Age: 84
End: 2024-11-01
Payer: MEDICARE

## 2024-11-07 ENCOUNTER — APPOINTMENT (OUTPATIENT)
Dept: PHYSICAL THERAPY | Facility: CLINIC | Age: 84
End: 2024-11-07
Payer: MEDICARE

## 2024-11-07 DIAGNOSIS — M54.41 CHRONIC BILATERAL LOW BACK PAIN WITH BILATERAL SCIATICA: ICD-10-CM

## 2024-11-07 DIAGNOSIS — M54.42 CHRONIC BILATERAL LOW BACK PAIN WITH BILATERAL SCIATICA: ICD-10-CM

## 2024-11-07 DIAGNOSIS — G89.29 CHRONIC BILATERAL LOW BACK PAIN WITH BILATERAL SCIATICA: ICD-10-CM

## 2024-11-07 PROCEDURE — 97110 THERAPEUTIC EXERCISES: CPT | Mod: GP

## 2024-11-07 NOTE — PROGRESS NOTES
"Physical Therapy  Physical Therapy Reassessment Note    Patient Name Marysol Hill   MRN: [id number]  Today's Date: 11/7/2024  Time Calculation  Start Time: 0405  Stop Time: 0443  Time Calculation (min): 38 min    Insurance:    Visit #:   9   Insurance Reviewed  (per information provided by  pre-cert team)   Authorization required:  N  Approved # of visits: MN  Authorization/MCR certification date range:  10/3/2024 to 1/1/2025     Therapy Diagnoses:   Problem List Items Addressed This Visit             ICD-10-CM    Back pain M54.9     General:  Reason for visit: Chronic LBP   Referred by: Dr. Cassie Crain MD appt:  PRN  Preferred Name:  Marysol  Script:  [facility] PT  Onset Date:  8/26/2024  Subjective:   Patient reports:  she feels a little better but her back still hurts    Have you fallen since last visit:  no    Precautions:  low    Pain:  5/10  Location/Type of pain:  Right LS pain > Left and radiates to sacrum/tailbone region    HEP compliance/understanding:  good, per     Objective:    Outcome Measures:  Other Measures  Oswestry Disablity Index (MENDY): 20% (10/50:  20% limitation of function.); currently 34    Posture:  flat LS and pelvic landmarks symmetrical.      Gait/Stairs: moderately decreased trunk rotation and hip extension.  Reciprocal pattern on stairs.      Palpation:  moderate tenderness B lumbar paraspinals and piriformis.      ROM:  Trunk Flexion:  2\" 3rd to floor  Extension: 0-20 (no change)  RSB:  2\" 3rd to fibular head (no change)  LSB:  2\" 3rd to fibular head (no change)  RR: 50%  LR: 50%    MMT:  Abd:  3-/5 to 4-  Bridge: 25%  B LE myotomes:  WFL and symmetrical    Flexibility:  Hamstrings:  90/90:  R:  -11 to -5  L: -18 to -5  Heelcords: 0 B  Hip flexors: min/mod B  Gluts: min B  Piriformis: min B    Treatment:   **= HEP progression today NV= Next visit  np= not performed  nb= non-billable  G= group HEP= discharged to [facility]  Therapeutic Exercise:     38 minutes  Objective measurements taken " and compared to initial evaluation  Goal achievement assessed  Verbally reviewed HEP with spouse and patient  Reviewed when to wear brace and when not to  Discussed POC for goal to get to community pool once pt is over    Education:  as above    Assessment:  Patient has made gains in PT for increased hip strength and slight decrease in subjective pain. Pt will benefit from continued skilled PT in aquatics to further prepare patient for safe transition to independent community pool with spouse's supervision  Patient is progressing with goals: partially  Skilled care:  reassessment and education    Goals:      ST weeks  Decrease pain by 1-2 points with VAS scale Goal partially met    Compliant with HEP with assist from spouse Goal partially met    LTG:  by discharge     pain by 3 or more point by the VAS scale and patient I with self-management of symptoms.     Decreased pain and increased function with ADL's and IADL's.  Improve Oswestry to 20 % limitation of function.    Plan:    Continue to progress per poc:   NV continue aquatics program     HEP:  Spouse states she has ample amount of HEP

## 2024-11-14 ENCOUNTER — APPOINTMENT (OUTPATIENT)
Dept: PHYSICAL THERAPY | Facility: CLINIC | Age: 84
End: 2024-11-14
Payer: MEDICARE

## 2024-11-14 ENCOUNTER — DOCUMENTATION (OUTPATIENT)
Dept: PHYSICAL THERAPY | Facility: CLINIC | Age: 84
End: 2024-11-14
Payer: MEDICARE

## 2024-11-14 PROBLEM — D47.2 MGUS (MONOCLONAL GAMMOPATHY OF UNKNOWN SIGNIFICANCE): Status: ACTIVE | Noted: 2024-08-06

## 2024-11-14 NOTE — PROGRESS NOTES
Physical Therapy                 Therapy Communication Note    Patient Name: Marysol Hill  MRN: [id number]  Department:   Room: Room/bed info not found  Today's Date: 11/14/2024     Discipline: Physical Therapy    Missed Visit Reason:   called to cancel. Patient is not feeling well.     Missed Time: Cancel    Comment:

## 2024-11-21 ENCOUNTER — APPOINTMENT (OUTPATIENT)
Dept: PHYSICAL THERAPY | Facility: CLINIC | Age: 84
End: 2024-11-21
Payer: MEDICARE

## 2024-11-21 ENCOUNTER — DOCUMENTATION (OUTPATIENT)
Dept: PHYSICAL THERAPY | Facility: CLINIC | Age: 84
End: 2024-11-21
Payer: MEDICARE

## 2024-11-21 NOTE — PROGRESS NOTES
Physical Therapy                 Therapy Communication Note    Patient Name: Marysol Hill  MRN: [id number]  Department:   Room: Room/bed info not found  Today's Date: 11/21/2024     Discipline: Physical Therapy    Missed Visit Reason: Pt arrived at wrong time for PT. Unable to return for scheduled time    Missed Time: Cancel    Comment:

## 2024-11-26 ENCOUNTER — TREATMENT (OUTPATIENT)
Dept: PHYSICAL THERAPY | Facility: CLINIC | Age: 84
End: 2024-11-26
Payer: MEDICARE

## 2024-11-26 DIAGNOSIS — G89.29 CHRONIC BILATERAL LOW BACK PAIN WITH BILATERAL SCIATICA: ICD-10-CM

## 2024-11-26 DIAGNOSIS — M54.42 CHRONIC BILATERAL LOW BACK PAIN WITH BILATERAL SCIATICA: ICD-10-CM

## 2024-11-26 DIAGNOSIS — M54.41 CHRONIC BILATERAL LOW BACK PAIN WITH BILATERAL SCIATICA: ICD-10-CM

## 2024-11-26 PROCEDURE — 97113 AQUATIC THERAPY/EXERCISES: CPT | Mod: GP,CQ

## 2024-11-26 NOTE — PROGRESS NOTES
l Therapy Progress Note    Patient Name Marysol Hill   MRN: [id number]  Today's Date: 11/26/2024  Time Calculation  Start Time: 1135  Stop Time: 1215  Time Calculation (min): 40 min    Insurance:    Visit #:   10   Insurance Reviewed  (per information provided by  pre-cert team)   Authorization required:  N  Approved # of visits: MN  Authorization/MCR certification date range:  10/3/2024 to 1/1/2025     Therapy Diagnoses:   Problem List Items Addressed This Visit             ICD-10-CM    Back pain M54.9       General:  Reason for visit: Chronic LBP   Referred by: Dr. Cassie Crain MD appt:  PRN  Preferred Name:  Marysol  Script:  [facility]  Onset Date:  8/26/2024  Last Re:Assessment: 11/7/2024    Subjective:   Patient reports her right lower back is a bit achy.     Have you fallen since last visit:  no    Medical Hx/  Precautions: memory loss, HTN, Lung disease, Osteoporosis, RA     Adult Screening Risk:       Fall Risk:  low    Pain: 2/10  Location/Type of pain: R  lumbar constant ache  What increases pain: prolonged sitting, standing, walking, moving  What decreases pain: getting in the pool, resting    HEP compliance/understanding:  building AQUA HEP    Objective:   Objective Measurements:    Posture:  decreased lumbar lordosis, slight rounded shoulders   Gait: Patient ambulated without assistive device with step through gait on pool deck with [place]  Stairs: Ascends/descends stairs w/ step to gait using bilateral hand rails w/ [place] - less fearful transitioning off steps this visit  Balance: Fair  In Water assist provided for safety      Treatment:   **= HEP progression today NV= Next visit  np= not performed  nb= non-billable  G= group HEP= discharged to [facility]    Aquatics:          40 minutes  Trialed Deck Treatment this visit   *Floater**  **Plans to go to [facility] for AQUA HEP**  **IN Water Assist**  C=Current, BTW = Back to Wall, NV = Next Visit, NP = Not Performed, G = Group, NB = non-billable  "    Forward/Retro ambulation w/ barbell support w/ CGA 6 laps each  Lateral ambulation w/ barbell support w/ min A at trunk x 4 laps each direction 4 laps    HR/TR  w/ barbell w/ min A  2 x 10  Marching w/ barbel lw/ CGA 2  x 10  3 way hip AROM w/ wall and barbell support w/ tactile cues for BLE control against buoyancy 2  x 10 each   Partial Squats at wall 2 x 10 tactile, verbal cueing and demonstration for proper technique NV  Hip Circumduction w/ wall and barbell support CW/CCW 2 x 10 each alanna    BTW abdominal draw in 5\" x 10  BTW DLS open board push/pull, up/down  x 15  BTW Can Cans  2 x 10  BTW Dynamic Hamstring Stretch 2 x 10 symmetrical alanna  BTW DLS BUE open paddles flex/ext, ABD/ADD symmetrical/reciprocal  x 15 each  BTW DLS open paddles horizontal ADD/ABD N2 x 10  BTW SKTC w/ CGA 10\" x 10 alanna    BTW Piriformis stretch w/ CGA 20\" x 3 alanna     Chi NE #1-#8 3 diaphragmatic breaths each   BTW SKTC 10\" x 5 NV  BTW piriformis stretch 30\" x 3 alanna NV    Deep End with noodle - intermittent tacite cues for BLE control against buoyancy and verbal cues to hold on during all deep end exercises  Cycle 3 minutes    Cross Country/Scissors 1 minute each   Hang w/ min A for control against buoyancy 3 minutes      Stair Stretches: BLE hamstrings/hip flexors/calves 30\" x 3  Step Ups x 10 each alanna lead LE w/ BUE support x 10 each        Education:  BLE control against buoyancy,  postural awareness, pacing in pain free ROM, balance strategies, gait training    Assessment:  Patient tolerated treatment well, with  improved mobility with repetition of exercises.   Patient needs continued work on/skilled PT for:  ROM, flexibility, posture, scapular and dynamic stabilization, strength,closed chain activities and soft tissue mobility, to address remaining functional, objective and subjective deficits to allow them to return to prior /optimal level of function with ADLs.  Patient is progressing  towards goals: of improved TA strength " and BLE mobility  Skilled care: Aquatics, patient education    Plan:    Continue to progress per poc:   NV progress  Chi.     HEP:  building AQUA HEP

## 2024-12-03 ENCOUNTER — DOCUMENTATION (OUTPATIENT)
Dept: PHYSICAL THERAPY | Facility: CLINIC | Age: 84
End: 2024-12-03
Payer: MEDICARE

## 2024-12-05 ENCOUNTER — APPOINTMENT (OUTPATIENT)
Dept: PHYSICAL THERAPY | Facility: CLINIC | Age: 84
End: 2024-12-05
Payer: MEDICARE

## 2024-12-06 DIAGNOSIS — R41.3 MEMORY LOSS: ICD-10-CM

## 2024-12-06 RX ORDER — DONEPEZIL HYDROCHLORIDE 10 MG/1
10 TABLET, FILM COATED ORAL NIGHTLY
Qty: 90 TABLET | Refills: 3 | Status: SHIPPED | OUTPATIENT
Start: 2024-12-06 | End: 2025-12-06

## 2024-12-09 ENCOUNTER — APPOINTMENT (OUTPATIENT)
Dept: CARDIOLOGY | Facility: CLINIC | Age: 84
End: 2024-12-09
Payer: MEDICARE

## 2024-12-09 VITALS
SYSTOLIC BLOOD PRESSURE: 110 MMHG | HEIGHT: 64 IN | WEIGHT: 118 LBS | OXYGEN SATURATION: 98 % | HEART RATE: 64 BPM | BODY MASS INDEX: 20.14 KG/M2 | DIASTOLIC BLOOD PRESSURE: 64 MMHG

## 2024-12-09 DIAGNOSIS — I10 PRIMARY HYPERTENSION: ICD-10-CM

## 2024-12-09 DIAGNOSIS — Z95.0 PACEMAKER: ICD-10-CM

## 2024-12-09 DIAGNOSIS — R94.31 ABNORMAL EKG: Primary | ICD-10-CM

## 2024-12-09 DIAGNOSIS — E78.2 MIXED HYPERLIPIDEMIA: ICD-10-CM

## 2024-12-09 DIAGNOSIS — I25.10 CORONARY ARTERY DISEASE INVOLVING NATIVE CORONARY ARTERY OF NATIVE HEART WITHOUT ANGINA PECTORIS: ICD-10-CM

## 2024-12-09 DIAGNOSIS — I35.1 MILD AORTIC REGURGITATION: ICD-10-CM

## 2024-12-09 DIAGNOSIS — I83.12 VARICOSE VEINS OF BOTH LOWER EXTREMITIES WITH INFLAMMATION: ICD-10-CM

## 2024-12-09 DIAGNOSIS — I83.11 VARICOSE VEINS OF BOTH LOWER EXTREMITIES WITH INFLAMMATION: ICD-10-CM

## 2024-12-09 PROCEDURE — 99213 OFFICE O/P EST LOW 20 MIN: CPT | Performed by: INTERNAL MEDICINE

## 2024-12-09 PROCEDURE — 1159F MED LIST DOCD IN RCRD: CPT | Performed by: INTERNAL MEDICINE

## 2024-12-09 PROCEDURE — 3074F SYST BP LT 130 MM HG: CPT | Performed by: INTERNAL MEDICINE

## 2024-12-09 PROCEDURE — 3078F DIAST BP <80 MM HG: CPT | Performed by: INTERNAL MEDICINE

## 2024-12-09 PROCEDURE — 1036F TOBACCO NON-USER: CPT | Performed by: INTERNAL MEDICINE

## 2024-12-09 RX ORDER — MONTELUKAST SODIUM 10 MG/1
10 TABLET ORAL NIGHTLY
COMMUNITY
Start: 2024-08-29 | End: 2025-08-29

## 2024-12-09 NOTE — PROGRESS NOTES
Subjective   Marysol Hill is a 84 y.o. female.    Chief Complaint:  Follow-up coronary disease permanent pacemaker and valvular heart disease.    HPI    She has no specific complaints.  No shortness of breath or dyspnea on exertion.  No anginal symptoms.  Some of her her history was obtained from her  as she is developing some memory issues.    On September 29, 2020 she underwent permanent pacemaker insertion with a dual chamber pacer. The device is a [employer] MRI model [id number].     Her past cardiac history is otherwise unremarkable. She denies any history of hypertension. There is no past history of myocardial infarction. There is no history of diabetes and no smoking history. She does have a history of hyperlipidemia. There is a positive family history of cardiac issues. Both father and mother had a pacemaker placed.     Her diagnosis of coronary artery disease is based on the finding of calcifications in the distribution the coronary arteries.     She has had a history of parathyroid surgery. She has a past history of pneumonia.      She usually spends the winter with her  in [facility] [place] [facility]      Allergies  Medication    · No Known Drug Allergies   Recorded By: Clarita Calvert; 1/13/2016 11:53:23 AM     Family History  Mother    · Family history of Alzheimer's dementia  Father    · Family history of cerebrovascular accident (CVA) (V17.1) (Z82.3)     Social History  Problems     ·  is POA and handles finances.     · Former smoker (V15.82) (Z87.891)   · quit 1962      · Occasional alcohol use     Review of Systems   Constitutional: Positive for malaise/fatigue.   Cardiovascular:  Positive for dyspnea on exertion.   Musculoskeletal:  Positive for arthritis and back pain.   Psychiatric/Behavioral:  Positive for memory loss.    All other systems reviewed and are negative.    Current Outpatient Medications   Medication Sig Dispense Refill    albuterol 2.5 mg /3 mL (0.083 %) nebulizer  solution Inhale 3 mL every 6 hours if needed.      calcium carbonate 600 mg calcium (1,500 mg) tablet Take 1 tablet (1,500 mg) by mouth once daily.      CALCIUM CITRATE-VITAMIN D3 ORAL Take 1 tablet by mouth once daily.      CALCIUM POLYCARBOPHIL ORAL Take by mouth.      cholecalciferol (Vitamin D-3) 25 MCG (1000 UT) tablet Take 1 tablet (25 mcg) by mouth once daily.      ciprofloxacin (Ciloxan) 0.3 % ophthalmic solution       clobetasol (Temovate) 0.05 % ointment APPLY TOPICALLY TO THE AFFECTED AREA TWICE DAILY      cyanocobalamin (Vitamin B-12) 1,000 mcg tablet Take 1 tablet (1,000 mcg) by mouth once daily.      denosumab (Prolia) 60 mg/mL syringe Inject under the skin.      donepezil (Aricept) 10 mg tablet Take 1 tablet (10 mg) by mouth once daily at bedtime. 90 tablet 3    escitalopram (Lexapro) 20 mg tablet Take 0.5 tablets (10 mg) by mouth once daily.      FLUTICASONE PROPIONATE INHL Inhale.      hydrOXYzine HCL (Atarax) 10 mg tablet Take 1 tablet (10 mg) by mouth 2 times a day.      montelukast (Singulair) 10 mg tablet Take 1 tablet (10 mg) by mouth once daily at bedtime.      multivit-minerals/folic acid (CENTRUM ADULTS ORAL) Take 1 tablet by mouth once daily.      omega-3 fatty acids-fish oil 360-1,200 mg capsule Take by mouth.      omeprazole (PriLOSEC) 40 mg DR capsule Take 1 capsule (40 mg) by mouth once daily.      psyllium seed, with sugar, (METAMUCIL, SUGAR, ORAL) Take by mouth 3 times a day.      rosuvastatin (Crestor) 40 mg tablet TAKE 1 TABLET BY MOUTH DAILY 90 tablet 3    timolol (Timoptic) 0.5 % ophthalmic solution Administer 1 drop into affected eye(s) twice a day.      vit A/vit C/vit E/zinc/copper (PRESERVISION AREDS ORAL) Take 1 capsule by mouth once daily.      vit C-E-cupric-zinc-lutein (PreserVision Lutein) 226-90-0.8-5 mg capsule capsule Take 1 capsule (5 mg) by mouth once daily.       No current facility-administered medications for this visit.        Visit Vitals  BP 96/64 (BP Location:  "Left arm)   Pulse 64   Ht 1.626 m (5' 4\")   Wt 53.5 kg (118 lb)   SpO2 98%   BMI 20.25 kg/m²   OB Status Postmenopausal   Smoking Status Former   BSA 1.55 m²        Objective     Constitutional:       Appearance: Not in distress.   Neck:      Vascular: JVD normal.   Pulmonary:      Breath sounds: Normal breath sounds.   Cardiovascular:      Normal rate. Regular rhythm. Normal S1. Normal S2.       Murmurs: There is no murmur.      No gallop.    Pulses:     Intact distal pulses.   Edema:     Peripheral edema absent.   Abdominal:      General: There is no distension.      Palpations: Abdomen is soft.   Neurological:      Mental Status: Alert. Exhibits a cognitive deficit.         Lab Review:   Lab Results   Component Value Date     10/17/2024    K 4.6 10/17/2024     10/17/2024    CO2 31 10/17/2024    BUN 12 10/17/2024    CREATININE 0.69 10/17/2024    GLUCOSE 95 10/17/2024    CALCIUM 9.4 10/17/2024     Lab Results   Component Value Date    CHOL 131 10/17/2024    TRIG 92 10/17/2024    HDL 55.7 10/17/2024       Assessment:    1.  Coronary artery disease.  No anginal symptoms.  Conservative medical management.    2.  Hyperlipidemia.  Cholesterol 131, HDL 56, LDL 57.    3.  Status post pacer.  Pacer is functioning normally and is pacing 82%.  Battery life is about 7 years.    4.  Aortic regurgitation.  Mild on the basis of of her last echo study.    5.  Hypotension.  Asymptomatic.  No symptoms of syncope or near syncope.  No falling episodes  "

## 2024-12-10 ENCOUNTER — TREATMENT (OUTPATIENT)
Dept: PHYSICAL THERAPY | Facility: CLINIC | Age: 84
End: 2024-12-10
Payer: MEDICARE

## 2024-12-10 DIAGNOSIS — G89.29 CHRONIC BILATERAL LOW BACK PAIN WITH BILATERAL SCIATICA: ICD-10-CM

## 2024-12-10 DIAGNOSIS — M54.41 CHRONIC BILATERAL LOW BACK PAIN WITH BILATERAL SCIATICA: ICD-10-CM

## 2024-12-10 DIAGNOSIS — M54.42 CHRONIC BILATERAL LOW BACK PAIN WITH BILATERAL SCIATICA: ICD-10-CM

## 2024-12-10 PROCEDURE — 97113 AQUATIC THERAPY/EXERCISES: CPT | Mod: GP,CQ

## 2024-12-10 NOTE — PROGRESS NOTES
l Therapy Progress Note    Patient Name Marysol Hill   MRN: [id number]  Today's Date: 12/10/2024  Time Calculation  Start Time: 1050  Stop Time: 1130  Time Calculation (min): 40 min    Insurance:    Visit #:   11   Insurance Reviewed  (per information provided by  pre-cert team)   Authorization required:  N  Approved # of visits: MN  Authorization/MCR certification date range:  10/3/2024 to 1/1/2025     Therapy Diagnoses:   Problem List Items Addressed This Visit             ICD-10-CM    Back pain M54.9       General:  Reason for visit: Chronic LBP   Referred by: Dr. Cassie Crain MD appt:  PRN  Preferred Name:  Marysol  Script:  Iveta PT  Onset Date:  8/26/2024  Last Re:Assessment: 11/7/2024    Subjective:   Patient reports her pain is running across her lower back to her hips and to her side abs this morning. States that it is constant. This is the first year she is not going to [place] after [place].     Have you fallen since last visit:  no    Medical Hx/  Precautions: memory loss, HTN, Lung disease, Osteoporosis, RA     Adult Screening Risk:       Fall Risk:  low    Pain: 5/10  Location/Type of pain: lumbar, alanna proximal hips to obliques constant ache  What increases pain: prolonged sitting, standing, walking, moving  What decreases pain: getting in the pool, resting    HEP compliance/understanding:  building AQUA HEP    Objective:   Objective Measurements:    Posture:  decreased lumbar lordosis, slight rounded shoulders   Gait: Patient ambulated without assistive device with step through gait on pool deck with [place]  Stairs: Ascends/descends stairs w/ step to gait using bilateral hand rails w/ [place] - less fearful transitioning off steps this visit  Balance: Fair  In Water assist provided for safety      Treatment:   **= HEP progression today NV= Next visit  np= not performed  nb= non-billable  G= group HEP= discharged to HEP    Aquatics:          40 minutes  Trialed Deck Treatment this visit  "  *Floater**  **Plans to go to [facility] for AQUA HEP**  **IN Water Assist**  C=Current, BTW = Back to Wall, NV = Next Visit, NP = Not Performed, G = Group, NB = non-billable     [place] Method BUE cup carry Forward/Retro ambulation w/ CGA 6 laps each  [place] Method BUE cup carry Lateral ambulation w/ min A at trunk x 4 laps each direction 4 laps  TM FWD t=6 C=0 2 minutes  C=6 4 way HR/TR  w/ barbell w/ min A  x 10 each  C=6 4 way marching w/ barbel lw/ CGA   x 10 each  C=6 3 way hip AROM w/ wall and barbell support w/ tactile cues for BLE control against buoyancy 2  x 10 each   C=6 Partial Squats at wall 2 x 10 tactile, verbal cueing and demonstration for proper technique NV  C=6 Hip Circumduction w/ wall and barbell support CW/CCW 2 x 10 each alanna    BTW abdominal draw in 5\" x 10  BTW DLS open board push/pull, up/down  x 15  BTW Can Cans  2 x 10  BTW Dynamic Hamstring Stretch 2 x 10 symmetrical alanna  BTW DLS BUE open paddles flex/ext, ABD/ADD symmetrical/reciprocal  x 15 each NV  BTW DLS open paddles horizontal ADD/ABD N2 x 10 NV  BTW SKTC w/ CGA 10\" x 10 alanna    BTW Piriformis stretch w/ CGA 20\" x 3 alanna   NV   Chi NE #1-#8 3 diaphragmatic breaths each NV  BTW SKTC 10\" x 5 NV  BTW piriformis stretch 30\" x 3 alanna NV    Deep End with noodle - intermittent tacite cues for BLE control against buoyancy and verbal cues to hold on during all deep end exercises  Cycle 3 minutes  NV  Cross Country/Scissors 1 minute each NV  Hang w/ min A for control against buoyancy 3 minutes  NV    Stair Stretches: BLE hamstrings/hip flexors/calves 30\" x 3  Step Ups x 10 each alanna lead LE w/ BUE support x 10 each NV        Education:  BLE control against buoyancy,  postural awareness, pacing in pain free ROM, balance strategies, gait training, visual fixation    Assessment:  Patient tolerated treatment progression well, challenged with [place] Method Ambulation cup carry, with improved dynamic balance with visual fixation and increased step " length and bilateral heel strike and toe off with verbal cueing. Performed progression of added multi-directional strengthening against added current with improved trunk stabilization and balance, requiring tactile cueing for postural awareness.   Patient needs continued work on/skilled PT for:  ROM, flexibility, posture, scapular and dynamic stabilization, strength,closed chain activities and soft tissue mobility, to address remaining functional, objective and subjective deficits to allow them to return to prior /optimal level of function with ADLs.  Patient is progressing  towards goals: of improved TA strength, increased BLE mobility, improved gait, improved dynamic balance  Skilled care: Aquatics, patient education    Plan:    Continue to progress per poc:   NV progress  Chi per tolerance.     HEP:  building AQUA HEP

## 2024-12-12 ENCOUNTER — APPOINTMENT (OUTPATIENT)
Dept: PHYSICAL THERAPY | Facility: CLINIC | Age: 84
End: 2024-12-12
Payer: MEDICARE

## 2024-12-17 ENCOUNTER — TREATMENT (OUTPATIENT)
Dept: PHYSICAL THERAPY | Facility: CLINIC | Age: 84
End: 2024-12-17
Payer: MEDICARE

## 2024-12-17 DIAGNOSIS — G89.29 CHRONIC BILATERAL LOW BACK PAIN WITH BILATERAL SCIATICA: ICD-10-CM

## 2024-12-17 DIAGNOSIS — M54.41 CHRONIC BILATERAL LOW BACK PAIN WITH BILATERAL SCIATICA: ICD-10-CM

## 2024-12-17 DIAGNOSIS — M54.42 CHRONIC BILATERAL LOW BACK PAIN WITH BILATERAL SCIATICA: ICD-10-CM

## 2024-12-17 PROCEDURE — 97113 AQUATIC THERAPY/EXERCISES: CPT | Mod: GP,CQ

## 2024-12-17 NOTE — PROGRESS NOTES
l Therapy Progress Note    Patient Name Marysol Hill   MRN: [id number]  Today's Date: 12/17/2024  Time Calculation  Start Time: 1046  Stop Time: 1145  Time Calculation (min): 59 min    Insurance:    Visit #:   12   Insurance Reviewed  (per information provided by  pre-cert team)   Authorization required:  N  Approved # of visits: MN  Authorization/MCR certification date range:  10/3/2024 to 1/1/2025     Therapy Diagnoses:   Problem List Items Addressed This Visit             ICD-10-CM    Back pain M54.9       General:  Reason for visit: Chronic LBP   Referred by: Dr. Cassie Crain MD appt:  PRN  Preferred Name:  Marysol  Script:  [facility]  Onset Date:  8/26/2024  Last Re:Assessment: 11/7/2024    Subjective:   Patient reports a constant ache across her lower back and hips, but feels pain relief in the pool. C/O the pool air making her cold.     Have you fallen since last visit:  no    Medical Hx/  Precautions: memory loss, HTN, Lung disease, Osteoporosis, RA     Adult Screening Risk:       Fall Risk:  low    Pain: 4-5/10  Location/Type of pain: lumbar, alanna proximal hips, lumbar constant ache  What increases pain: prolonged sitting, standing, walking, moving  What decreases pain: getting in the pool, resting    HEP compliance/understanding:  building AQUA HEP    Objective:   Objective Measurements:    Posture:  decreased lumbar lordosis, slight rounded shoulders   Gait: Patient ambulated without assistive device with step through gait on pool deck with [place]  Stairs: Ascends/descends stairs w/ step to gait using bilateral hand rails w/ [place] - less fearful transitioning off steps this visit  Balance: Performed dynamic balance progression of  Chi w/ min - mod A   In Water assist provided for safety      Treatment:   **= HEP progression today NV= Next visit  np= not performed  nb= non-billable  G= group HEP= discharged to HEP    Aquatics:          59 minutes  Trialed Deck Treatment this visit  "  *Floater**  **Plans to go to [facility] for AQUA HEP**  **IN Water Assist**  C=Current, BTW = Back to Wall, NV = Next Visit, NP = Not Performed, G = Group, NB = non-billable     [place] Method BUE cup carry Forward/Retro ambulation w/ CGA 6 laps each  [place] Method BUE cup carry Lateral ambulation w/ min A at trunk x 4 laps each direction 4 laps  TM FWD t=6 C=0 2 minutes  C=6 4 way HR/TR  w/ barbell w/ min A  x 10 each  C=6 4 way marching w/ barbel lw/ CGA   x 10 each  C=6 3 way hip AROM w/ wall and barbell support w/ tactile cues for BLE control against buoyancy 2  x 10 each   C=6 Partial Squats at wall 2 x 10 tactile, verbal cueing and demonstration for proper technique NV  C=6 Hip Circumduction w/ wall and barbell support CW/CCW 2 x 10 each alanna    BTW abdominal draw in 5\" x 10  BTW DLS open board push/pull, up/down  x 15  BTW Can Cans  2 x 10  BTW Dynamic Hamstring Stretch 2 x 10 symmetrical alanna  BTW DLS BUE open paddles flex/ext, ABD/ADD symmetrical/reciprocal  x 15 each NV  BTW DLS open paddles horizontal ADD/ABD N2 x 10 NV   Chi NE #1-#13, no #12  w/ min A - mod A 3 diaphragmatic breaths each   BTW SKTC w/ min A 10\" x 5   BTW piriformis stretch w/ min A w/ MFR to L piriformis  30\" x 3 alanna     Deep End with noodle w/ CGA - intermittent tacite cues for BLE control against buoyancy and verbal cues to hold on during all deep end exercises  Cycle w/ CGA- min A 3 minutes    Cross Country/Scissors w/ CGA 1 minute each   Hang w/ CGA 3 minutes      Stair Stretches: BLE hamstrings/hip flexors/calves 30\" x 3  Step Ups x 10 each alanna lead LE w/ BUE support x 10 each NV        Education:  BLE control against buoyancy,  postural awareness, pacing in pain free ROM, balance strategies, gait training, visual fixation, community pool resources    Assessment:  Patient tolerated treatment progression well, challenged with  Chi coordination and dynamic balance initially, but improved with repetition. Performed deep end " exercises with improved control against buoyancy this visit. Palpated improved soft tissue pliability at left piriformis with repetition of stretches with myofascial release. Discussed signing up at [facility] in January for AQUA HEP.    Patient needs continued work on/skilled PT for:  ROM, flexibility, posture, scapular and dynamic stabilization, strength,closed chain activities and soft tissue mobility, to address remaining functional, objective and subjective deficits to allow them to return to prior /optimal level of function with ADLs.  Patient is progressing  towards goals: of improved TA strength, increased BLE mobility, improved gait, improved dynamic balance  Skilled care: Aquatics, patient education    Plan:    Continue to progress per poc:   NV decrease BUE support per tolerance and add Yoga    HEP:  building AQUA HEP

## 2024-12-19 ENCOUNTER — HOSPITAL ENCOUNTER (OUTPATIENT)
Dept: CARDIOLOGY | Facility: CLINIC | Age: 84
Discharge: HOME | End: 2024-12-19
Payer: MEDICARE

## 2024-12-19 ENCOUNTER — APPOINTMENT (OUTPATIENT)
Dept: PHYSICAL THERAPY | Facility: CLINIC | Age: 84
End: 2024-12-19
Payer: MEDICARE

## 2024-12-19 DIAGNOSIS — Z95.0 PACEMAKER: ICD-10-CM

## 2024-12-19 DIAGNOSIS — I49.5 SICK SINUS SYNDROME (MULTI): ICD-10-CM

## 2024-12-19 PROCEDURE — 93296 REM INTERROG EVL PM/IDS: CPT

## 2024-12-24 ENCOUNTER — TREATMENT (OUTPATIENT)
Dept: PHYSICAL THERAPY | Facility: CLINIC | Age: 84
End: 2024-12-24
Payer: MEDICARE

## 2024-12-24 DIAGNOSIS — M54.41 CHRONIC BILATERAL LOW BACK PAIN WITH BILATERAL SCIATICA: ICD-10-CM

## 2024-12-24 DIAGNOSIS — G89.29 CHRONIC BILATERAL LOW BACK PAIN WITH BILATERAL SCIATICA: ICD-10-CM

## 2024-12-24 DIAGNOSIS — M54.42 CHRONIC BILATERAL LOW BACK PAIN WITH BILATERAL SCIATICA: ICD-10-CM

## 2024-12-24 PROCEDURE — 97113 AQUATIC THERAPY/EXERCISES: CPT | Mod: GP,CQ

## 2024-12-24 NOTE — PROGRESS NOTES
l Therapy Progress Note    Patient Name Marysol Hill   MRN: [id number]  Today's Date: 12/24/2024  Time Calculation  Start Time: 1053  Stop Time: 1133  Time Calculation (min): 40 min    Insurance:    Visit #:   13   Insurance Reviewed  (per information provided by  pre-cert team)   Authorization required:  N  Approved # of visits: MN  Authorization/MCR certification date range:  10/3/2024 to 1/1/2025     Therapy Diagnoses:   Problem List Items Addressed This Visit             ICD-10-CM    Back pain M54.9       General:  Reason for visit: Chronic LBP   Referred by: Dr. Cassie Crain MD appt:  PRN  Preferred Name:  Marysol  Script:  [facility]  Onset Date:  8/26/2024  Last Re:Assessment: 11/7/2024    Subjective:   Patient reports tenderness at her right lower back.     Have you fallen since last visit:  no    Medical Hx/  Precautions: memory loss, HTN, Lung disease, Osteoporosis, RA     Adult Screening Risk:       Fall Risk:  low    Pain: 4/10  Location/Type of pain: lumbar, R lumbar constant ache  What increases pain: prolonged sitting, standing, walking, moving  What decreases pain: getting in the pool, resting    HEP compliance/understanding:  building AQUA HEP    Objective:   Objective Measurements:    Posture:  decreased lumbar lordosis, slight rounded shoulders   Gait: Patient ambulated without assistive device with step through gait on pool deck with [place]  Stairs: Ascends/descends stairs w/ step to gait using bilateral hand rails w/ [place] - less fearful transitioning off steps this visit  In Water assist provided for safety      Treatment:   **= HEP progression today NV= Next visit  np= not performed  nb= non-billable  G= group HEP= discharged to [facility]    Aquatics:          40 minutes  Trialed Deck Treatment this visit   *Floater**  **Plans to go to [facility] for AQUA HEP**  **IN Water Assist**  C=Current, BTW = Back to Wall, NV = Next Visit, NP = Not Performed, G = Group, NB = non-billable     [place]  "Method BUE cup carry Forward/Retro ambulation w/ CGA 6 laps each  [place] Method BUE cup carry Lateral ambulation w/ min A at trunk x 4 laps each direction 4 laps  TM FWD t=6 C=0 w/ CGA 2 minutes  C=6 4 way HR/TR  w/ barbell w/ min A  x 10 each  C=6 4 way marching w/ barbel lw/ CGA   x 10 each  C=6 3 way hip AROM w/ wall and barbell support w/ tactile cues for BLE control against buoyancy 2  x 10 each   C=6 Partial Squats at wall 2 x 10 tactile, verbal cueing and demonstration for proper technique NV  C=6 Hip Circumduction w/ wall and barbell support CW/CCW 2 x 10 each alanna    BTW abdominal draw in 5\" x 10  BTW DLS open board push/pull, up/down  x 15  BTW Can Cans  2 x 10  BTW Dynamic Hamstring Stretch 2 x 10 symmetrical alanna  BTW DLS BUE open paddles flex/ext, ABD/ADD symmetrical/reciprocal  x 15 each   BTW DLS open paddles horizontal ADD/ABD N2 x 10 NV   Chi NE #1-#13, no #12  w/ min A - mod A 3 diaphragmatic breaths each NV  BTW SKTC w/ min A 10\" x 5 NV  BTW piriformis stretch w/ min A w/ MFR to L piriformis  30\" x 3 alanna NV    Deep End with noodle w/ CGA - intermittent tacite cues for BLE control against buoyancy and verbal cues to hold on during all deep end exercises  Cycle w/ CGA- min A 3 minutes    Cross Country/Scissors w/ CGA 1 minute each   Hang w/ CGA 3 minutes    DKTC 10\" x 5    Stair Stretches: BLE hamstrings/hip flexors/calves 30\" x 3 NV  Step Ups x 10 each alanna lead LE w/ BUE support x 10 each NV        Education:  BLE control against buoyancy,  postural awareness, pacing in pain free ROM, balance strategies, gait training, visual fixation, redirection to exercises     Assessment:  Patient tolerated treatment progression fair, with redirecition required to stay on task and in water assist still required for safety awareness. Patient continues to require assist to stay in 4 feet water depth and to stay on TM for ambulation. Patient was preoccupied with blowing her nose often this visit, with increased " pacing of exercise due to this.   Patient needs continued work on/skilled PT for:  ROM, flexibility, posture, scapular and dynamic stabilization, strength,closed chain activities and soft tissue mobility, to address remaining functional, objective and subjective deficits to allow them to return to prior /optimal level of function with ADLs.  Patient is progressing  towards goals: of improved TA strength, increased BLE mobility, improved gait, improved dynamic balance  Skilled care: Aquatics, patient education    Plan:    Continue to progress per poc:   NV add Yoga    HEP:  building AQUA HEP

## 2025-01-02 ENCOUNTER — APPOINTMENT (OUTPATIENT)
Dept: PHYSICAL THERAPY | Facility: CLINIC | Age: 85
End: 2025-01-02
Payer: MEDICARE

## 2025-01-03 ENCOUNTER — DOCUMENTATION (OUTPATIENT)
Dept: PHYSICAL THERAPY | Facility: CLINIC | Age: 85
End: 2025-01-03
Payer: MEDICARE

## 2025-01-03 ENCOUNTER — APPOINTMENT (OUTPATIENT)
Dept: PHYSICAL THERAPY | Facility: CLINIC | Age: 85
End: 2025-01-03
Payer: MEDICARE

## 2025-01-03 NOTE — PROGRESS NOTES
Physical Therapy                 Therapy Communication Note    Patient Name: Marysol Hill  MRN: [id number]  Department:   Room: Room/bed info not found  Today's Date: 1/3/2025     Discipline: Physical Therapy          Missed Visit Reason:  another appointment    Missed Time: Cancel    Comment:

## 2025-01-27 ENCOUNTER — HOSPITAL ENCOUNTER (OUTPATIENT)
Dept: RADIOLOGY | Facility: CLINIC | Age: 85
Discharge: HOME | End: 2025-01-27
Payer: MEDICARE

## 2025-01-27 DIAGNOSIS — Z12.31 ENCOUNTER FOR SCREENING MAMMOGRAM FOR MALIGNANT NEOPLASM OF BREAST: ICD-10-CM

## 2025-01-27 PROCEDURE — 77067 SCR MAMMO BI INCL CAD: CPT | Performed by: RADIOLOGY

## 2025-01-27 PROCEDURE — 77067 SCR MAMMO BI INCL CAD: CPT

## 2025-01-27 PROCEDURE — 77063 BREAST TOMOSYNTHESIS BI: CPT | Performed by: RADIOLOGY

## 2025-03-20 ENCOUNTER — HOSPITAL ENCOUNTER (OUTPATIENT)
Dept: CARDIOLOGY | Facility: CLINIC | Age: 85
Discharge: HOME | End: 2025-03-20
Payer: MEDICARE

## 2025-03-20 DIAGNOSIS — Z95.0 PACEMAKER: ICD-10-CM

## 2025-03-20 DIAGNOSIS — I49.5 SICK SINUS SYNDROME (MULTI): ICD-10-CM

## 2025-03-20 PROCEDURE — 93296 REM INTERROG EVL PM/IDS: CPT

## 2025-07-14 ENCOUNTER — APPOINTMENT (OUTPATIENT)
Dept: CARDIOLOGY | Facility: CLINIC | Age: 85
End: 2025-07-14
Payer: MEDICARE

## 2025-07-14 VITALS
HEART RATE: 60 BPM | WEIGHT: 116 LBS | DIASTOLIC BLOOD PRESSURE: 70 MMHG | BODY MASS INDEX: 19.81 KG/M2 | SYSTOLIC BLOOD PRESSURE: 116 MMHG | HEIGHT: 64 IN | OXYGEN SATURATION: 96 %

## 2025-07-14 DIAGNOSIS — I10 PRIMARY HYPERTENSION: Primary | ICD-10-CM

## 2025-07-14 DIAGNOSIS — I25.10 CORONARY ARTERY DISEASE INVOLVING NATIVE CORONARY ARTERY OF NATIVE HEART WITHOUT ANGINA PECTORIS: ICD-10-CM

## 2025-07-14 DIAGNOSIS — Z95.0 PACEMAKER: ICD-10-CM

## 2025-07-14 DIAGNOSIS — R94.31 ABNORMAL EKG: ICD-10-CM

## 2025-07-14 DIAGNOSIS — E78.2 MIXED HYPERLIPIDEMIA: ICD-10-CM

## 2025-07-14 DIAGNOSIS — I35.1 MILD AORTIC REGURGITATION: ICD-10-CM

## 2025-07-14 PROCEDURE — 3074F SYST BP LT 130 MM HG: CPT | Performed by: INTERNAL MEDICINE

## 2025-07-14 PROCEDURE — 93000 ELECTROCARDIOGRAM COMPLETE: CPT | Performed by: INTERNAL MEDICINE

## 2025-07-14 PROCEDURE — 1036F TOBACCO NON-USER: CPT | Performed by: INTERNAL MEDICINE

## 2025-07-14 PROCEDURE — 1159F MED LIST DOCD IN RCRD: CPT | Performed by: INTERNAL MEDICINE

## 2025-07-14 PROCEDURE — 99213 OFFICE O/P EST LOW 20 MIN: CPT | Performed by: INTERNAL MEDICINE

## 2025-07-14 PROCEDURE — 3078F DIAST BP <80 MM HG: CPT | Performed by: INTERNAL MEDICINE

## 2025-07-14 RX ORDER — ERGOCALCIFEROL 1.25 MG/1
CAPSULE ORAL
COMMUNITY
Start: 2025-03-19

## 2025-07-14 NOTE — PROGRESS NOTES
"Nilton Hill is a 85 y.o. female.    Chief Complaint:  Follow-up coronary disease, permanent pacemaker.    HPI    She denies chest pain or shortness of breath or dyspnea with exertion.  Most of her history was obtained from her  as the patient has increasing memory issues.  Overall she feels well.  Their  reports no medical problems or major medical illnesses or hospitalizations.  Has had some noncardiac issues including back problems.    Her past cardiac history is otherwise unremarkable. She denies any history of hypertension. There is no past history of myocardial infarction. There is no history of diabetes and no smoking history. She does have a history of hyperlipidemia. There is a positive family history of cardiac issues. Both father and mother had a pacemaker placed.     Her diagnosis of coronary artery disease is based on the finding of calcifications in the distribution the coronary arteries.     She has had a history of parathyroid surgery. She has a past history of pneumonia.      She usually spends the winter with her  in [facility]      Allergies  Medication    · No Known Drug Allergies     Family History  Mother    · Family history of Alzheimer's dementia  Father    · Family history of cerebrovascular accident (CVA) (V17.1) (Z82.3)     Social History  Problems     ·  is POA and handles finances.     · Former smoker (V15.82) (Z87.891)   · quit 1962      · Occasional alcohol use     Review of Systems   Constitutional: Positive for malaise/fatigue.   Cardiovascular:  Positive for dyspnea on exertion.   Musculoskeletal:  Positive for arthritis and back pain.   Psychiatric/Behavioral:  Positive for memory loss.    All other systems reviewed and are negative.    Current Medications[1]     Visit Vitals  /70 (BP Location: Left arm)   Pulse 60   Ht 1.626 m (5' 4\")   Wt 52.6 kg (116 lb)   SpO2 96%   BMI 19.91 kg/m²   OB Status Postmenopausal   Smoking Status " Former   BSA 1.54 m²        Objective     Constitutional:       Appearance: Not in distress.   Neck:      Vascular: JVD normal.   Pulmonary:      Breath sounds: Normal breath sounds.   Cardiovascular:      Normal rate. Regular rhythm. Normal S1. Normal S2.       Murmurs: There is no murmur.      No gallop.    Pulses:     Intact distal pulses.   Edema:     Peripheral edema absent.   Abdominal:      General: There is no distension.      Palpations: Abdomen is soft.   Neurological:      Mental Status: Alert.         Lab Review:   Lab Results   Component Value Date     10/17/2024    K 4.6 10/17/2024     10/17/2024    CO2 31 10/17/2024    BUN 12 10/17/2024    CREATININE 0.69 10/17/2024    GLUCOSE 95 10/17/2024    CALCIUM 9.4 10/17/2024     Lab Results   Component Value Date    WBC 5.8 10/17/2024    HGB 13.4 10/17/2024    HCT 43.2 10/17/2024     (H) 10/17/2024     10/17/2024     Lab Results   Component Value Date    CHOL 131 10/17/2024    TRIG 92 10/17/2024    HDL 55.7 10/17/2024       Assessment:    1.  Coronary artery disease.  Based on the findings of calcifications in the distribution of the coronary arteries seen on his CT scan of the chest.  Continue medical management.    2.  Hypertension.  Blood pressures are excellent.    3.  Hyperlipidemia.  Latest LDL is 57.    4.  Status post pacer.  Pacer function is about 6 years.  Pacemaker is functioning normally.  No arrhythmias.         [1]   Current Outpatient Medications   Medication Sig Dispense Refill    albuterol 2.5 mg /3 mL (0.083 %) nebulizer solution Inhale 3 mL every 6 hours if needed.      calcium carbonate 600 mg calcium (1,500 mg) tablet Take 1 tablet (1,500 mg) by mouth once daily.      CALCIUM CITRATE-VITAMIN D3 ORAL Take 1 tablet by mouth once daily.      CALCIUM POLYCARBOPHIL ORAL Take by mouth.      cholecalciferol (Vitamin D-3) 25 MCG (1000 UT) tablet Take 1 tablet (25 mcg) by mouth once daily.      ciprofloxacin (Ciloxan) 0.3  % ophthalmic solution       clobetasol (Temovate) 0.05 % ointment APPLY TOPICALLY TO THE AFFECTED AREA TWICE DAILY      cyanocobalamin (Vitamin B-12) 1,000 mcg tablet Take 1 tablet (1,000 mcg) by mouth once daily.      denosumab (Prolia) 60 mg/mL syringe Inject under the skin.      donepezil (Aricept) 10 mg tablet Take 1 tablet (10 mg) by mouth once daily at bedtime. 90 tablet 3    ergocalciferol (Vitamin D-2) 1250 mcg (50,000 units) capsule TAKE 1 CAPSULE BY MOUTH EVERY MONDAY      escitalopram (Lexapro) 20 mg tablet Take 0.5 tablets (10 mg) by mouth once daily.      FLUTICASONE PROPIONATE INHL Inhale.      hydrOXYzine HCL (Atarax) 10 mg tablet Take 1 tablet (10 mg) by mouth 2 times a day.      montelukast (Singulair) 10 mg tablet Take 1 tablet (10 mg) by mouth once daily at bedtime.      multivit-minerals/folic acid (CENTRUM ADULTS ORAL) Take 1 tablet by mouth once daily.      omega-3 fatty acids-fish oil 360-1,200 mg capsule Take by mouth.      omeprazole (PriLOSEC) 40 mg DR capsule Take 1 capsule (40 mg) by mouth once daily.      psyllium seed, with sugar, (METAMUCIL, SUGAR, ORAL) Take by mouth 3 times a day.      rosuvastatin (Crestor) 40 mg tablet TAKE 1 TABLET BY MOUTH DAILY 90 tablet 3    timolol (Timoptic) 0.5 % ophthalmic solution Administer 1 drop into affected eye(s) twice a day.      vit A/vit C/vit E/zinc/copper (PRESERVISION AREDS ORAL) Take 1 capsule by mouth once daily.      vit C-E-cupric-zinc-lutein (PreserVision Lutein) 226-90-0.8-5 mg capsule capsule Take 1 capsule (5 mg) by mouth once daily.       No current facility-administered medications for this visit.

## 2025-09-17 ENCOUNTER — APPOINTMENT (OUTPATIENT)
Dept: NEUROLOGY | Facility: CLINIC | Age: 85
End: 2025-09-17
Payer: MEDICARE